# Patient Record
Sex: MALE | Race: WHITE | NOT HISPANIC OR LATINO | URBAN - METROPOLITAN AREA
[De-identification: names, ages, dates, MRNs, and addresses within clinical notes are randomized per-mention and may not be internally consistent; named-entity substitution may affect disease eponyms.]

---

## 2021-04-10 ENCOUNTER — EMERGENCY (EMERGENCY)
Facility: HOSPITAL | Age: 76
LOS: 1 days | Discharge: HOME | End: 2021-04-10
Attending: EMERGENCY MEDICINE
Payer: MEDICARE

## 2021-04-10 VITALS
OXYGEN SATURATION: 96 % | DIASTOLIC BLOOD PRESSURE: 74 MMHG | TEMPERATURE: 100 F | HEART RATE: 110 BPM | SYSTOLIC BLOOD PRESSURE: 126 MMHG | RESPIRATION RATE: 17 BRPM

## 2021-04-10 VITALS
TEMPERATURE: 101 F | DIASTOLIC BLOOD PRESSURE: 70 MMHG | WEIGHT: 179.9 LBS | HEART RATE: 140 BPM | OXYGEN SATURATION: 93 % | RESPIRATION RATE: 20 BRPM | HEIGHT: 68 IN | SYSTOLIC BLOOD PRESSURE: 150 MMHG

## 2021-04-10 LAB
RAPID RVP RESULT: SIGNIFICANT CHANGE UP
SARS-COV-2 RNA SPEC QL NAA+PROBE: SIGNIFICANT CHANGE UP

## 2021-04-10 PROCEDURE — 99283 EMERGENCY DEPT VISIT LOW MDM: CPT | Mod: CS,GC

## 2021-04-10 RX ORDER — ACETAMINOPHEN 500 MG
975 TABLET ORAL ONCE
Refills: 0 | Status: COMPLETED | OUTPATIENT
Start: 2021-04-10 | End: 2021-04-10

## 2021-04-10 RX ADMIN — Medication 975 MILLIGRAM(S): at 19:28

## 2021-04-10 NOTE — ED PROVIDER NOTE - OBJECTIVE STATEMENT
Attn - pt seen in G14 - pt with fever early this am and this afternoon with myalgia, headache, mild cough.  NO: sob/be, diarrhea, urinary symptoms, rash, cp, abdo pain.  wife getting chemo.  Last smoked in 1974.  Pt rec'd Pfizer vaccine x 2.

## 2021-04-10 NOTE — ED PROVIDER NOTE - PHYSICAL EXAMINATION
Attn - alert, febrile, tachycardia, NAD, no pallor or jaundice, PERRL 3 mm, moist mm, skin - warm and dry, Lungs - clear, no w/r/r, good BS bilaterally, Cor - rr, no M, no rub, Abdo - ND, soft, NT, no HSM, no CVAT, no guarding or rebound. Extremities - no edema, no calf tenderness, distal pulses intact and symmetrical, Neuro - intact and non-focal

## 2021-04-10 NOTE — ED PROVIDER NOTE - NSFOLLOWUPINSTRUCTIONS_ED_ALL_ED_FT
Self Quarantine for probable COVID.   Tylenol (acetaminophen) two tablets every 4-6 hours or Motrin (Ibuprofen) 2-3 tabs every 6-8 hours if needed for fever.  Maximum Tylenol of 4,000 mg per day.   Return to ER if develop shortness of breath.  Will be enrolled in [facility] program if your COVID test is positive.  Your results will come through your cell phone.

## 2021-04-10 NOTE — ED ADULT NURSE NOTE - OBJECTIVE STATEMENT
Male 76 years old with past medical history of Bladder cancer came in for sudden onset of fever associated with chills today. Temperature was 102.9. Pt took Tylenol 1 gram at 1300 this afternoon. Pt said he got Pfizer covid vaccine last February. Denies sick contacts. Denies chest pain, sob, cough, N/V or urinary symptoms. Safety maintained. Will continue to reassess.

## 2021-04-10 NOTE — ED ADULT TRIAGE NOTE - CHIEF COMPLAINT QUOTE
fever, chills, cough. took tylenol around 1 pm today.   s/p covid vaccine in Feb. denies sick contacts

## 2021-04-10 NOTE — ED PROVIDER NOTE - PATIENT PORTAL LINK FT
You can access the FollowMyHealth Patient Portal offered by [facility] by registering at the following website: [email or web address]. By joining [facility]’s FollowMyHealth portal, you will also be able to view your health information using other applications (apps) compatible with our system.

## 2021-04-12 ENCOUNTER — INPATIENT (INPATIENT)
Facility: HOSPITAL | Age: 76
LOS: 3 days | Discharge: HOME | DRG: 872 | End: 2021-04-16
Attending: INTERNAL MEDICINE | Admitting: INTERNAL MEDICINE
Payer: MEDICARE

## 2021-04-12 VITALS
TEMPERATURE: 98 F | HEIGHT: 68 IN | SYSTOLIC BLOOD PRESSURE: 82 MMHG | DIASTOLIC BLOOD PRESSURE: 53 MMHG | WEIGHT: 179.9 LBS | OXYGEN SATURATION: 96 % | HEART RATE: 120 BPM | RESPIRATION RATE: 19 BRPM

## 2021-04-12 DIAGNOSIS — R09.89 OTHER SPECIFIED SYMPTOMS AND SIGNS INVOLVING THE CIRCULATORY AND RESPIRATORY SYSTEMS: ICD-10-CM

## 2021-04-12 DIAGNOSIS — N39.0 URINARY TRACT INFECTION, SITE NOT SPECIFIED: ICD-10-CM

## 2021-04-12 PROBLEM — Z78.9 OTHER SPECIFIED HEALTH STATUS: Chronic | Status: ACTIVE | Noted: 2021-04-10

## 2021-04-12 LAB
ALBUMIN SERPL ELPH-MCNC: 3.7 G/DL — SIGNIFICANT CHANGE UP (ref 3.3–5)
ALP SERPL-CCNC: 79 U/L — SIGNIFICANT CHANGE UP (ref 40–120)
ALT FLD-CCNC: 30 U/L — SIGNIFICANT CHANGE UP (ref 10–45)
ANION GAP SERPL CALC-SCNC: 19 MMOL/L — HIGH (ref 5–17)
APPEARANCE UR: ABNORMAL
APTT BLD: 29.5 SEC — SIGNIFICANT CHANGE UP (ref 27.5–35.5)
AST SERPL-CCNC: 35 U/L — SIGNIFICANT CHANGE UP (ref 10–40)
BACTERIA # UR AUTO: NEGATIVE — SIGNIFICANT CHANGE UP
BASOPHILS # BLD AUTO: 0 K/UL — SIGNIFICANT CHANGE UP (ref 0–0.2)
BASOPHILS NFR BLD AUTO: 0 % — SIGNIFICANT CHANGE UP (ref 0–2)
BILIRUB SERPL-MCNC: 1.4 MG/DL — HIGH (ref 0.2–1.2)
BILIRUB UR-MCNC: NEGATIVE — SIGNIFICANT CHANGE UP
BUN SERPL-MCNC: 40 MG/DL — HIGH (ref 7–23)
CALCIUM SERPL-MCNC: 8.6 MG/DL — SIGNIFICANT CHANGE UP (ref 8.4–10.5)
CHLORIDE SERPL-SCNC: 96 MMOL/L — SIGNIFICANT CHANGE UP (ref 96–108)
CO2 SERPL-SCNC: 20 MMOL/L — LOW (ref 22–31)
COLOR SPEC: YELLOW — SIGNIFICANT CHANGE UP
CREAT SERPL-MCNC: 2 MG/DL — HIGH (ref 0.5–1.3)
DIFF PNL FLD: ABNORMAL
EOSINOPHIL # BLD AUTO: 0 K/UL — SIGNIFICANT CHANGE UP (ref 0–0.5)
EOSINOPHIL NFR BLD AUTO: 0 % — SIGNIFICANT CHANGE UP (ref 0–6)
EPI CELLS # UR: 1 /HPF — SIGNIFICANT CHANGE UP
GAS PNL BLDV: SIGNIFICANT CHANGE UP
GLUCOSE SERPL-MCNC: 139 MG/DL — HIGH (ref 70–99)
GLUCOSE UR QL: NEGATIVE — SIGNIFICANT CHANGE UP
HCT VFR BLD CALC: 33.2 % — LOW (ref 39–50)
HGB BLD-MCNC: 10.7 G/DL — LOW (ref 13–17)
HYALINE CASTS # UR AUTO: 3 /LPF — HIGH (ref 0–2)
INR BLD: 1.14 RATIO — SIGNIFICANT CHANGE UP (ref 0.88–1.16)
KETONES UR-MCNC: NEGATIVE — SIGNIFICANT CHANGE UP
LACTATE BLDV-MCNC: 1.6 MMOL/L — SIGNIFICANT CHANGE UP (ref 0.7–2)
LACTATE BLDV-MCNC: 3.8 MMOL/L — HIGH (ref 0.7–2)
LACTATE SERPL-SCNC: 3 MMOL/L — HIGH (ref 0.7–2)
LACTATE SERPL-SCNC: 4.1 MMOL/L — CRITICAL HIGH (ref 0.7–2)
LEUKOCYTE ESTERASE UR-ACNC: ABNORMAL
LYMPHOCYTES # BLD AUTO: 0.33 K/UL — LOW (ref 1–3.3)
LYMPHOCYTES # BLD AUTO: 1.8 % — LOW (ref 13–44)
MCHC RBC-ENTMCNC: 19.2 PG — LOW (ref 27–34)
MCHC RBC-ENTMCNC: 32.2 GM/DL — SIGNIFICANT CHANGE UP (ref 32–36)
MCV RBC AUTO: 59.6 FL — LOW (ref 80–100)
MONOCYTES # BLD AUTO: 0.81 K/UL — SIGNIFICANT CHANGE UP (ref 0–0.9)
MONOCYTES NFR BLD AUTO: 4.4 % — SIGNIFICANT CHANGE UP (ref 2–14)
NEUTROPHILS # BLD AUTO: 17.35 K/UL — HIGH (ref 1.8–7.4)
NEUTROPHILS NFR BLD AUTO: 85.8 % — HIGH (ref 43–77)
NITRITE UR-MCNC: NEGATIVE — SIGNIFICANT CHANGE UP
PH UR: 6 — SIGNIFICANT CHANGE UP (ref 5–8)
PLATELET # BLD AUTO: 123 K/UL — LOW (ref 150–400)
POTASSIUM SERPL-MCNC: 3 MMOL/L — LOW (ref 3.5–5.3)
POTASSIUM SERPL-SCNC: 3 MMOL/L — LOW (ref 3.5–5.3)
PROT SERPL-MCNC: 6.6 G/DL — SIGNIFICANT CHANGE UP (ref 6–8.3)
PROT UR-MCNC: 100 — SIGNIFICANT CHANGE UP
PROTHROM AB SERPL-ACNC: 13.6 SEC — SIGNIFICANT CHANGE UP (ref 10.6–13.6)
RAPID RVP RESULT: SIGNIFICANT CHANGE UP
RBC # BLD: 5.57 M/UL — SIGNIFICANT CHANGE UP (ref 4.2–5.8)
RBC # FLD: 16.5 % — HIGH (ref 10.3–14.5)
RBC CASTS # UR COMP ASSIST: 3 /HPF — SIGNIFICANT CHANGE UP (ref 0–4)
SARS-COV-2 RNA SPEC QL NAA+PROBE: SIGNIFICANT CHANGE UP
SODIUM SERPL-SCNC: 135 MMOL/L — SIGNIFICANT CHANGE UP (ref 135–145)
SP GR SPEC: 1.02 — SIGNIFICANT CHANGE UP (ref 1.01–1.02)
UROBILINOGEN FLD QL: NEGATIVE — SIGNIFICANT CHANGE UP
WBC # BLD: 18.5 K/UL — HIGH (ref 3.8–10.5)
WBC # FLD AUTO: 18.5 K/UL — HIGH (ref 3.8–10.5)
WBC UR QL: 199 /HPF — HIGH (ref 0–5)

## 2021-04-12 PROCEDURE — 99291 CRITICAL CARE FIRST HOUR: CPT | Mod: CS

## 2021-04-12 PROCEDURE — 93010 ELECTROCARDIOGRAM REPORT: CPT

## 2021-04-12 PROCEDURE — 71045 X-RAY EXAM CHEST 1 VIEW: CPT | Mod: 26

## 2021-04-12 RX ORDER — ACETAMINOPHEN 500 MG
650 TABLET ORAL EVERY 6 HOURS
Refills: 0 | Status: DISCONTINUED | OUTPATIENT
Start: 2021-04-12 | End: 2021-04-16

## 2021-04-12 RX ORDER — CEFTRIAXONE 500 MG/1
1000 INJECTION, POWDER, FOR SOLUTION INTRAMUSCULAR; INTRAVENOUS ONCE
Refills: 0 | Status: COMPLETED | OUTPATIENT
Start: 2021-04-12 | End: 2021-04-12

## 2021-04-12 RX ORDER — HEPARIN SODIUM 5000 [USP'U]/ML
5000 INJECTION INTRAVENOUS; SUBCUTANEOUS EVERY 12 HOURS
Refills: 0 | Status: DISCONTINUED | OUTPATIENT
Start: 2021-04-12 | End: 2021-04-16

## 2021-04-12 RX ORDER — SIMVASTATIN 20 MG/1
20 TABLET, FILM COATED ORAL AT BEDTIME
Refills: 0 | Status: DISCONTINUED | OUTPATIENT
Start: 2021-04-12 | End: 2021-04-16

## 2021-04-12 RX ORDER — PANTOPRAZOLE SODIUM 20 MG/1
40 TABLET, DELAYED RELEASE ORAL
Refills: 0 | Status: DISCONTINUED | OUTPATIENT
Start: 2021-04-12 | End: 2021-04-16

## 2021-04-12 RX ORDER — LOSARTAN POTASSIUM 100 MG/1
100 TABLET, FILM COATED ORAL DAILY
Refills: 0 | Status: DISCONTINUED | OUTPATIENT
Start: 2021-04-12 | End: 2021-04-16

## 2021-04-12 RX ORDER — SODIUM CHLORIDE 9 MG/ML
2500 INJECTION, SOLUTION INTRAVENOUS ONCE
Refills: 0 | Status: COMPLETED | OUTPATIENT
Start: 2021-04-12 | End: 2021-04-12

## 2021-04-12 RX ORDER — TRIAMTERENE/HYDROCHLOROTHIAZID 75 MG-50MG
1 TABLET ORAL DAILY
Refills: 0 | Status: DISCONTINUED | OUTPATIENT
Start: 2021-04-12 | End: 2021-04-16

## 2021-04-12 RX ADMIN — SODIUM CHLORIDE 2500 MILLILITER(S): 9 INJECTION, SOLUTION INTRAVENOUS at 10:50

## 2021-04-12 RX ADMIN — SODIUM CHLORIDE 2500 MILLILITER(S): 9 INJECTION, SOLUTION INTRAVENOUS at 12:45

## 2021-04-12 RX ADMIN — Medication 1 TABLET(S): at 17:08

## 2021-04-12 RX ADMIN — SIMVASTATIN 20 MILLIGRAM(S): 20 TABLET, FILM COATED ORAL at 21:05

## 2021-04-12 RX ADMIN — CEFTRIAXONE 100 MILLIGRAM(S): 500 INJECTION, POWDER, FOR SOLUTION INTRAMUSCULAR; INTRAVENOUS at 12:39

## 2021-04-12 RX ADMIN — Medication 650 MILLIGRAM(S): at 19:15

## 2021-04-12 NOTE — H&P ADULT - ASSESSMENT
Patient is a 76 year old male with PMHx of remote bladder CA, HTN, HLD presenting for second visit to the ER since Saturday for persistent fevers and shortness of breath. Patient reports that he was having fevers up to 103F when he came to the ER on Saturday, was Covid swabbed and RVP was done and both were negative. Patient states that he received both Covid vaccines > 1mo ago and has not been in contact with anyone with infectious type symptoms or who recently traveled. He states that he has had persistent fevers last one being 101.4 at 9AM today. Patient took Tylenol but still not feeling well generally so he decided to come back today. He denies any cough, chest pain, nausea, vomiting, diarrhea. Denies any other new symptoms.    # Sepsis:  + Fevers of unknown origin   Rapid HR  WBC 18.50  Lactate 3.0, repeat 4.1  D-Dimer 805  Follow up blood and urine cultures  CTA held due to eGFR  Appreciate ID consult    # UTI:  s/p Ceftriaxone x1 in ED   Follow up urine cultures    # MONALISA:  Baseline Cr unknown  Follow up urine lytes  Follow up renal ultrasound  Appreciate renal consult    # HTN:  Chronic, stable  Home meds: Losartan 100MG daily, Triamt HCTZ 37.5MG-25MG daily    # HLD:  Chronic, stable  Home med: Simvastatin 20MG QHS    # DVT ppx:  Heparin subq Q12H Patient is a 76 year old male with PMHx of remote bladder CA, HTN, HLD presenting for second visit to the ER since Saturday for persistent fevers and shortness of breath. Patient reports that he was having fevers up to 103F when he came to the ER on Saturday, was Covid swabbed and RVP was done and both were negative. Patient states that he received both Covid vaccines > 1mo ago and has not been in contact with anyone with infectious type symptoms or who recently traveled. He states that he has had persistent fevers last one being 101.4 at 9AM today. Patient took Tylenol but still not feeling well generally so he decided to come back today. He denies any cough, chest pain, nausea, vomiting, diarrhea. Denies any other new symptoms.    # Sepsis:  + Fevers of unknown origin   Covid swabs negative (from [facility] ED sat 4/10)  Rapid HR  WBC 18.50  Lactate 3.0, repeat 4.1  D-Dimer 805  Follow up blood and urine cultures  CTA held due to eGFR  Appreciate ID consult    # UTI:  s/p Ceftriaxone x1 in ED   Follow up urine cultures    # MONALISA:  Baseline Cr unknown  Follow up urine lytes  Follow up renal ultrasound  Appreciate renal consult    # HTN:  Chronic, stable  Home meds: Losartan 100MG daily, Triamt HCTZ 37.5MG-25MG daily    # HLD:  Chronic, stable  Home med: Simvastatin 20MG QHS    # DVT ppx:  Heparin subq Q12H

## 2021-04-12 NOTE — ED ADULT TRIAGE NOTE - CHIEF COMPLAINT QUOTE
fever shortness of breath and dyspnea, dizziness when walking since last Wednesday  here on Saturday, negative for Covid

## 2021-04-12 NOTE — ED ADULT NURSE NOTE - OBJECTIVE STATEMENT
5y/o male aaox4 hx blader CA presents to the ED ambulatory  from home c/o fever, dizziness, chills, sob . Pt states that sudden began on Wednesday fever, chills, dizziness while walking, sob upon exertion, pt come to ED last Saturday for the same symptoms, DC home after tested covid (results negative)  but the fever is persistent  (103), SOB , controlling the fever w/ Tylenol, last dosage 2 hours ago, also c/o weakness    . Denies, n/v, abd pain, diarrhea/constipation, numbness/tingling, urinary symptoms, in no respiratory distress, no CP, Safety and comfort measures initiated- bed placed in lowest position and side rails raised. Pt oriented to call bell system.

## 2021-04-12 NOTE — ED PROVIDER NOTE - CLINICAL SUMMARY MEDICAL DECISION MAKING FREE TEXT BOX
Bharath Irving MD, FACEP: In this physician's medical judgement based on clinical history and physical exam, patient with fevers of unknown origin, rapid hr and sensation of shortness of breath, patient with neobladder and no abdominal pain, no pleuritic chest pain, no nausea/vomiting, no diarrhea. Patient with viral infection vs occult uti secondary to neobladder and remains asymptomatic for uti other than tachycardia and fever.   Will obtain IV x 2, cbc, cmp, +/-ce, VBG with lactate at time 0, fluid bolus @ 30cc/kg initiated upon departure from the room on initial assessment within 30 minutes of arrival, urine analysis and blood cultures x 2 are obtained prior to antibiotics =>prior to administration of antibiotics goal within 3 hours of arrival, and will repeat lactate if the original is elevated and do so within 6 hours of initial lactate.   wbc noted 18k  antibiotics given  repeat lactate requested  CTA held due to eGFR and awaiting UA for source of infection, likely sob and is secondary to tachycardia in the setting of infection and dimer is elevated due to this as well, dimer was ordered secondary to concern for COVID-19, if infectious work up is unrevealing will consider heparin and inpatient evaluation for dvt/pe  Will follow up on labs, analgesia, imaging, reassess and disposition to the inpatient team as clinically indicated.

## 2021-04-12 NOTE — H&P ADULT - ATTENDING COMMENTS
Pt seen and examined 4/12/21.  Agree with PA H+P above  Vital/imaging/labs were reviewed  Pt comfortable in bed.  No significant pulmonary/cardiac/abdominal findings, although he mentioned to me some intermittent sharp rt flank pain.  But he denies any recent changes in his urination habits.  He is admitted for complicated UTI and MONALISA (although will check previous values in PMD's chart).  Will check urine lytes, renal U/S, and monitor for any urinary retention.  Cont ceftiaxone in meantime while we monitor the urine and blood cultures.

## 2021-04-12 NOTE — H&P ADULT - HISTORY OF PRESENT ILLNESS
Patient is a 76 year old male with PMHx of remote bladder CA presenting for second visit to the ER since Saturday for persistent fevers and shortness of breath. Patient reports that he was having fevers up to 103F when he came to the ER on Saturday, was Covid swabbed and RVP was done and both were negative. Patient states that he received both Covid vaccines > 1mo ago and has not been in contact with anyone with infectious type symptoms or who recently traveled. He states that he has had persistent fevers last one being 101.4 at 9AM today. Patient took Tylenol but still not feeling well generally so he decided to come back today. He denies any cough, chest pain, nausea, vomiting, diarrhea. Denies any other new symptoms. Patient is a 76 year old male with PMHx of remote bladder CA, HTN, HLD presenting for second visit to the ER since Saturday for persistent fevers and shortness of breath. Patient reports that he was having fevers up to 103F when he came to the ER on Saturday, was Covid swabbed and RVP was done and both were negative. Patient states that he received both Covid vaccines > 1mo ago and has not been in contact with anyone with infectious type symptoms or who recently traveled. He states that he has had persistent fevers last one being 101.4 at 9AM today. Patient took Tylenol but still not feeling well generally so he decided to come back today. He denies any cough, chest pain, nausea, vomiting, diarrhea. Denies any other new symptoms.

## 2021-04-12 NOTE — ED PROVIDER NOTE - OBJECTIVE STATEMENT
77 y/o M pmhx remote [facility] CA presenting for second visit to the ER since Saturday for persistent fevers and shortness of breath. Patient reports that he was having fevers up to 103F when he came to the ER on Saturday, was covid swabbed and RVP was done and both were negative. STates that he received both covid vaccines > 1mo ago and has not been in contact with anyone with infectious type symptoms or who recently traveled. He states that he has persisted to have fevers and not feeling well generally so he decided to come back today. He denies any cough, chest pain, nausea, vomiting, diarrhea. Denies any other new symptoms.

## 2021-04-12 NOTE — ED PROVIDER NOTE - CRITICAL CARE ATTENDING CONTRIBUTION TO CARE
Patient seen with PA.  See MDM above.   Patient endorsed to the medical team at the time of admission. Based on patient's history and physical exam, as well as the results of today's workup, I feel that patient warrants admission to the hospital for further workup/evaluation and continued management. I discussed the findings of today's workup with the patient and addressed the patient's questions and concerns. The patient was agreeable with admission. Our team spoke with the inpatient receiving team who accepted the patient for admission and subsequently took over the patient's care at the time of admission. The receiving team will follow up on pending labs, analgesia, any clinical imaging results, ancillary findings, reassess, and disposition as clinically indicated. Details of patient and plan conveyed to receiving physician team and conveyed back for understanding. There were no questions at this time about the patient's status, disposition, and plan. Patient's care to be taken over by receiving physician team at this time, all decisions regarding the progression of care will be made at their discretion.

## 2021-04-12 NOTE — H&P ADULT - NSHPREVIEWOFSYSTEMS_GEN_ALL_CORE
GENERAL: no weakness, + fever, no chills, no weight loss/gain  EYES/ENT: No visual changes, no vertigo or throat pain  NECK: No pain or stiffness   RESPIRATORY: no cough, no wheezing, no hemoptysis, no dyspnea, + shortness of breath  CARDIOVASCULAR: no chest pain or palpitations  GASTROINTESTINAL: no n/v/d, no abdominal or epigastric pain  GENITOURINARY: no dysuria, no frequency, no nocturia, no hematuria  MUSCULOSKELETAL: no trauma, no sprain/strain, no myalgias, no arthralgias, no fracture  NEUROLOGICAL: no HA, no dizziness, no weakness, no numbness  SKIN: No itching, rashes

## 2021-04-12 NOTE — ED PROVIDER NOTE - CARE PLAN
Principal Discharge DX:	Urinary tract infection   Principal Discharge DX:	Urinary tract infection  Secondary Diagnosis:	MONALISA (acute kidney injury)  Secondary Diagnosis:	Sepsis

## 2021-04-12 NOTE — ED PROVIDER NOTE - NS ED ROS FT
Constitutional: +fever and chills  Eyes: No visual changes, eye pain or redness  HEENT: No throat pain, ear pain, nasal pain. No nose bleeding.  CV: No chest pain or lower extremity edema  Resp: +mild shortness of breath. no cough  GI: No abd pain. No nausea or vomiting. No diarrhea. No constipation.   : No dysuria, hematuria.   MSK: No musculoskeletal pain  Skin: No rash  Neuro: No headache. No numbness or tingling. No weakness. Constitutional: +fever and chills  Eyes: No visual changes, eye pain or redness  HEENT: No throat pain, ear pain, nasal pain. No nose bleeding.  CV: No chest pain or lower extremity edema  Resp: +mild shortness of breath. no cough  GI: No abd pain. No nausea or vomiting. No diarrhea. No constipation.   : No dysuria, hematuria.   MSK: No musculoskeletal pain  Skin: No rash  Neuro: No headache. No numbness or tingling. No weakness.  ROS as per HPI, attending statement, or otherwise negative.

## 2021-04-12 NOTE — ED PROVIDER NOTE - PHYSICAL EXAMINATION
GEN: Well Appearing, Nontoxic, NAD  HEENT: NC/AT, Symm Facies. PERRL, EOMI, MMM, posterior pharynx clear  CV: No JVD/Bruits or stridor;  +S1S2. tachycardic, regular. w/o m/g/r  RESP: CTAB w/o w/r/r  ABD: Soft, nt/nd, +BS. No guarding/rebound. No RUQ tender, no CVAT  EXT/MSK: No lower extremity edema or calf tenderness. WWP, palpable pulses. FROMx4  SKIN: No erythema, lesions or rash  Neuro: Grossly intact, AOX3 with normal speech, CN II-XII intact; Sensation intact, motor 5/5 throughout. Gait normal GEN: Well Appearing, Nontoxic, NAD, mild rigors in room  HEENT: NC/AT, Symm Facies. PERRL, EOMI, MMM, posterior pharynx clear  CV: No JVD/Bruits or stridor;  +S1S2. tachycardic, regular. w/o m/g/r  RESP: CTAB w/o w/r/r  ABD: Soft, nt/nd, +BS. No guarding/rebound. No RUQ tender, no CVAT  EXT/MSK: No lower extremity edema or calf tenderness. WWP, palpable pulses. FROMx4  SKIN: No erythema, lesions or rash  Neuro: Grossly intact, AOX3 with normal speech, CN II-XII intact; Sensation intact, motor 5/5 throughout. Gait normal

## 2021-04-12 NOTE — ED PROVIDER NOTE - PROGRESS NOTE DETAILS
Sepsis re-evaluation- Vital signs appreciated, Capillary Refill: <2 sec; Pulses: full/equal bilaterally; Skin: Normal; Lungs: CTA b/l, skin exam well perfused. I have re-evaluated the patient's fluid status and reviewed the vital signs. Clinical evaluation demonstrates an appropriate response to fluid resuscitation, patient responding well to resuscitation. Given +urine with MONALISA, sepsis picture present at this time, relayed to admitting hospitalist that DD elevated in this setting we are going to refrain from CTA at this time and can be addressed during admission while treated for urosepsis. -Birdie Ochoa PA-C

## 2021-04-12 NOTE — H&P ADULT - NSHPLABSRESULTS_GEN_ALL_CORE
LABS:                        10.7   18.50 )-----------( 123      ( 2021 11:04 )             33.2     04-12    135  |  96  |  40<H>  ----------------------------<  139<H>  3.0<L>   |  20<L>  |  2.00<H>    Ca    8.6      2021 11:04    TPro  6.6  /  Alb  3.7  /  TBili  1.4<H>  /  DBili  x   /  AST  35  /  ALT  30  /  AlkPhos  79  04-12    PT/INR - ( 2021 11:05 )   PT: 13.6 sec;   INR: 1.14 ratio         PTT - ( 2021 11:05 )  PTT:29.5 sec  CAPILLARY BLOOD GLUCOSE            Urinalysis Basic - ( 2021 12:02 )    Color: Yellow / Appearance: Slightly Turbid / S.017 / pH: x  Gluc: x / Ketone: Negative  / Bili: Negative / Urobili: Negative   Blood: x / Protein: 100 / Nitrite: Negative   Leuk Esterase: Large / RBC: 3 /hpf /  /HPF   Sq Epi: x / Non Sq Epi: 1 /hpf / Bacteria: Negative        RADIOLOGY & ADDITIONAL TESTS:    < from: Xray Chest 1 View- PORTABLE-Urgent (21 @ 11:40) >    INTERPRETATION:    The heart is not enlarged. The mediastinum is not accurately evaluated on this image.  Elevated right hemidiaphragm.  Skin folds project over the right chest. There is linear atelectasis versus scar at the bases.The lungs are otherwise clear.  No pleural effusion or pneumothorax seen.  No acute bony abnormality noted.      IMPRESSION:  Limited right hemidiaphragm.    Linear atelectasis versus scar at the bases. The lungs are otherwise clear.    < end of copied text >        Imaging Personally Reviewed:  [x] YES  [ ] NO    Consultant(s) Notes Reviewed:  [x] YES  [ ] NO    Care Discussed with Consultants/Other Providers [x] YES  [ ] NO

## 2021-04-12 NOTE — ED PROVIDER NOTE - ATTENDING CONTRIBUTION TO CARE
See MDM above.   Patient endorsed to the medical team at the time of admission. Based on patient's history and physical exam, as well as the results of today's workup, I feel that patient warrants admission to the hospital for further workup/evaluation and continued management. I discussed the findings of today's workup with the patient and addressed the patient's questions and concerns. The patient was agreeable with admission. Our team spoke with the inpatient receiving team who accepted the patient for admission and subsequently took over the patient's care at the time of admission. The receiving team will follow up on pending labs, analgesia, any clinical imaging results, ancillary findings, reassess, and disposition as clinically indicated. Details of patient and plan conveyed to receiving physician team and conveyed back for understanding. There were no questions at this time about the patient's status, disposition, and plan. Patient's care to be taken over by receiving physician team at this time, all decisions regarding the progression of care will be made at their discretion.

## 2021-04-12 NOTE — H&P ADULT - NSHPPHYSICALEXAM_GEN_ALL_CORE
Vital Signs Last 24 Hrs  T(C): 36.7 (12 Apr 2021 13:15), Max: 36.8 (12 Apr 2021 10:54)  T(F): 98 (12 Apr 2021 13:15), Max: 98.3 (12 Apr 2021 10:54)  HR: 118 (12 Apr 2021 13:15) (96 - 135)  BP: 102/63 (12 Apr 2021 13:15) (82/53 - 109/85)  BP(mean): 76 (12 Apr 2021 13:15) (72 - 91)  RR: 18 (12 Apr 2021 13:15) (18 - 20)  SpO2: 100% (12 Apr 2021 13:15) (96% - 100%)    PHYSICAL EXAM:  GENERAL: NAD, well-developed, comfortable  HEAD:  Atraumatic, Normocephalic  EYES: EOMI, PERRLA, conjunctiva and sclera clear  NECK: Supple, No JVD  CHEST/LUNG: Clear to auscultation bilaterally; No wheeze  HEART: Regular rate and rhythm; No murmurs, rubs, or gallops  ABDOMEN: Soft, Nontender, Nondistended; Bowel sounds present  NEURO: AAOx3, no focal weakness, 5/5 b/l extremity strength, b/l knee no arthritis, no effusion   EXTREMITIES:  2+ Peripheral Pulses, No clubbing, cyanosis, or edema  SKIN: No rashes or lesions

## 2021-04-13 LAB
ANION GAP SERPL CALC-SCNC: 15 MMOL/L — SIGNIFICANT CHANGE UP (ref 5–17)
APPEARANCE UR: ABNORMAL
BILIRUB UR-MCNC: NEGATIVE — SIGNIFICANT CHANGE UP
BUN SERPL-MCNC: 42 MG/DL — HIGH (ref 7–23)
CALCIUM SERPL-MCNC: 7.9 MG/DL — LOW (ref 8.4–10.5)
CHLORIDE SERPL-SCNC: 96 MMOL/L — SIGNIFICANT CHANGE UP (ref 96–108)
CHLORIDE UR-SCNC: <35 MMOL/L — SIGNIFICANT CHANGE UP
CO2 SERPL-SCNC: 21 MMOL/L — LOW (ref 22–31)
COLOR SPEC: YELLOW — SIGNIFICANT CHANGE UP
COVID-19 SPIKE DOMAIN AB INTERP: POSITIVE
COVID-19 SPIKE DOMAIN ANTIBODY RESULT: >250 U/ML — HIGH
CREAT SERPL-MCNC: 1.7 MG/DL — HIGH (ref 0.5–1.3)
CULTURE RESULTS: NO GROWTH — SIGNIFICANT CHANGE UP
DIFF PNL FLD: ABNORMAL
GLUCOSE SERPL-MCNC: 110 MG/DL — HIGH (ref 70–99)
GLUCOSE UR QL: NEGATIVE — SIGNIFICANT CHANGE UP
HCT VFR BLD CALC: 28.7 % — LOW (ref 39–50)
HGB BLD-MCNC: 9.5 G/DL — LOW (ref 13–17)
KETONES UR-MCNC: NEGATIVE — SIGNIFICANT CHANGE UP
LEUKOCYTE ESTERASE UR-ACNC: ABNORMAL
MCHC RBC-ENTMCNC: 19.2 PG — LOW (ref 27–34)
MCHC RBC-ENTMCNC: 33.1 GM/DL — SIGNIFICANT CHANGE UP (ref 32–36)
MCV RBC AUTO: 57.9 FL — LOW (ref 80–100)
NITRITE UR-MCNC: NEGATIVE — SIGNIFICANT CHANGE UP
NRBC # BLD: 0 /100 WBCS — SIGNIFICANT CHANGE UP (ref 0–0)
OSMOLALITY UR: 313 MOS/KG — SIGNIFICANT CHANGE UP (ref 300–900)
PH UR: 6.5 — SIGNIFICANT CHANGE UP (ref 5–8)
PLATELET # BLD AUTO: 125 K/UL — LOW (ref 150–400)
POTASSIUM SERPL-MCNC: 2.8 MMOL/L — CRITICAL LOW (ref 3.5–5.3)
POTASSIUM SERPL-SCNC: 2.8 MMOL/L — CRITICAL LOW (ref 3.5–5.3)
POTASSIUM UR-SCNC: 25 MMOL/L — SIGNIFICANT CHANGE UP
PROT UR-MCNC: 100 — SIGNIFICANT CHANGE UP
RBC # BLD: 4.96 M/UL — SIGNIFICANT CHANGE UP (ref 4.2–5.8)
RBC # FLD: 16.2 % — HIGH (ref 10.3–14.5)
SARS-COV-2 IGG+IGM SERPL QL IA: >250 U/ML — HIGH
SARS-COV-2 IGG+IGM SERPL QL IA: POSITIVE
SODIUM SERPL-SCNC: 132 MMOL/L — LOW (ref 135–145)
SODIUM UR-SCNC: 33 MMOL/L — SIGNIFICANT CHANGE UP
SP GR SPEC: 1.01 — SIGNIFICANT CHANGE UP (ref 1.01–1.02)
SPECIMEN SOURCE: SIGNIFICANT CHANGE UP
UROBILINOGEN FLD QL: NEGATIVE — SIGNIFICANT CHANGE UP
WBC # BLD: 13.42 K/UL — HIGH (ref 3.8–10.5)
WBC # FLD AUTO: 13.42 K/UL — HIGH (ref 3.8–10.5)

## 2021-04-13 PROCEDURE — 76770 US EXAM ABDO BACK WALL COMP: CPT | Mod: 26

## 2021-04-13 RX ORDER — SODIUM CHLORIDE 0.65 %
1 AEROSOL, SPRAY (ML) NASAL
Refills: 0 | Status: DISCONTINUED | OUTPATIENT
Start: 2021-04-13 | End: 2021-04-16

## 2021-04-13 RX ORDER — CEFTRIAXONE 500 MG/1
1000 INJECTION, POWDER, FOR SOLUTION INTRAMUSCULAR; INTRAVENOUS EVERY 24 HOURS
Refills: 0 | Status: DISCONTINUED | OUTPATIENT
Start: 2021-04-13 | End: 2021-04-16

## 2021-04-13 RX ORDER — POTASSIUM CHLORIDE 20 MEQ
10 PACKET (EA) ORAL ONCE
Refills: 0 | Status: COMPLETED | OUTPATIENT
Start: 2021-04-13 | End: 2021-04-13

## 2021-04-13 RX ORDER — POTASSIUM CHLORIDE 20 MEQ
40 PACKET (EA) ORAL EVERY 4 HOURS
Refills: 0 | Status: COMPLETED | OUTPATIENT
Start: 2021-04-13 | End: 2021-04-13

## 2021-04-13 RX ADMIN — Medication 1 TABLET(S): at 04:41

## 2021-04-13 RX ADMIN — HEPARIN SODIUM 5000 UNIT(S): 5000 INJECTION INTRAVENOUS; SUBCUTANEOUS at 04:41

## 2021-04-13 RX ADMIN — Medication 650 MILLIGRAM(S): at 04:40

## 2021-04-13 RX ADMIN — Medication 40 MILLIEQUIVALENT(S): at 17:31

## 2021-04-13 RX ADMIN — Medication 650 MILLIGRAM(S): at 17:25

## 2021-04-13 RX ADMIN — Medication 650 MILLIGRAM(S): at 05:30

## 2021-04-13 RX ADMIN — Medication 100 MILLIEQUIVALENT(S): at 12:36

## 2021-04-13 RX ADMIN — Medication 40 MILLIEQUIVALENT(S): at 12:35

## 2021-04-13 RX ADMIN — PANTOPRAZOLE SODIUM 40 MILLIGRAM(S): 20 TABLET, DELAYED RELEASE ORAL at 04:40

## 2021-04-13 RX ADMIN — Medication 650 MILLIGRAM(S): at 10:36

## 2021-04-13 RX ADMIN — HEPARIN SODIUM 5000 UNIT(S): 5000 INJECTION INTRAVENOUS; SUBCUTANEOUS at 17:26

## 2021-04-13 RX ADMIN — Medication 40 MILLIEQUIVALENT(S): at 14:36

## 2021-04-13 RX ADMIN — Medication 650 MILLIGRAM(S): at 11:00

## 2021-04-13 RX ADMIN — CEFTRIAXONE 100 MILLIGRAM(S): 500 INJECTION, POWDER, FOR SOLUTION INTRAMUSCULAR; INTRAVENOUS at 12:36

## 2021-04-13 RX ADMIN — SIMVASTATIN 20 MILLIGRAM(S): 20 TABLET, FILM COATED ORAL at 21:10

## 2021-04-13 RX ADMIN — Medication 650 MILLIGRAM(S): at 23:44

## 2021-04-13 NOTE — PROGRESS NOTE ADULT - ASSESSMENT
Patient is a 76 year old male with PMHx of remote bladder CA, HTN, HLD presenting for second visit to the ER since Saturday for persistent fevers and shortness of breath. Patient reports that he was having fevers up to 103F when he came to the ER on Saturday, was Covid swabbed and RVP was done and both were negative. Patient states that he received both Covid vaccines > 1mo ago and has not been in contact with anyone with infectious type symptoms or who recently traveled. He states that he has had persistent fevers last one being 101.4 at 9AM today. Patient took Tylenol but still not feeling well generally so he decided to come back today. He denies any cough, chest pain, nausea, vomiting, diarrhea. Denies any other new symptoms.    # Sepsis:  2' UTI  Covid swabs negative (from [facility] ED sat 4/10)  Rapid HR  WBC 18.50  Lactate 3.0, repeat 4.1  D-Dimer 805  Follow up blood and urine cultures  CTA held due to eGFR  Appreciate ID consult    # UTI:  Cont ceftriaxone  Blood and urine cultures (-) so far    # MONALISA:  Likely 2' dehydration  Renal U/S showing only mild rt hydronephrosis  Cont PO fluids  Appreciate renal consult    # HTN:  Chronic, stable  Home meds: Losartan 100MG daily, Triamt HCTZ 37.5MG-25MG daily    # HLD:  Chronic, stable  Home med: Simvastatin 20MG QHS    # DVT ppx:  Heparin subq Q12H

## 2021-04-13 NOTE — GOALS OF CARE CONVERSATION - ADVANCED CARE PLANNING - CONVERSATION DETAILS
Discussed goals of care with the patient. Given to him our conversation  guide packet and MOLST form. Will follow up.

## 2021-04-13 NOTE — CONSULT NOTE ADULT - SUBJECTIVE AND OBJECTIVE BOX
[facility], Division of Infectious Diseases  SAM Tamayo, ASHUTOSH Warner  [phone]    HAL SINGLETON  76y, Male  [id number]    HPI--  HPI:  Patient is a 76 year old male with PMHx of remote bladder CA, HTN, HLD presenting for second visit to the ER since Saturday for persistent fevers and shortness of breath. Patient reports that he was having fevers up to 103F when he came to the ER on Saturday, was Covid swabbed and RVP was done and both were negative. Patient states that he received both Covid vaccines > 1mo ago and has not been in contact with anyone with infectious type symptoms or who recently traveled. He states that he has had persistent fevers last one being 101.4 at 9AM today. Patient took Tylenol but still not feeling well generally so he decided to come back today. He denies any cough, chest pain, nausea, vomiting, diarrhea. Denies any other new symptoms. (2021 14:17)        Active Medications--  acetaminophen   Tablet .. 650 milliGRAM(s) Oral every 6 hours PRN  cefTRIAXone   IVPB 1000 milliGRAM(s) IV Intermittent every 24 hours  heparin   Injectable 5000 Unit(s) SubCutaneous every 12 hours  losartan 100 milliGRAM(s) Oral daily  pantoprazole    Tablet 40 milliGRAM(s) Oral before breakfast  simvastatin 20 milliGRAM(s) Oral at bedtime  sodium chloride 0.65% Nasal 1 Spray(s) Both Nostrils four times a day PRN  triamterene 37.5 mG/hydrochlorothiazide 25 mG Tablet 1 Tablet(s) Oral daily    Antimicrobials:   cefTRIAXone   IVPB 1000 milliGRAM(s) IV Intermittent every 24 hours    Immunologic:     ROS:  CONSTITUTIONAL: No fevers or chills. No weakness or headache. No weight changes.  EYES/ENT: No visual or hearing changes. No sore throat or throat pain .  NECK: No pain or stiffness  RESPIRATORY: No cough, wheezing, or hemoptysis. No shortness of breath  CARDIOVASCULAR: No chest pain or palpitations  GASTROINTESTINAL: No abdominal pain. No nausea or vomiting. No diarrhea or constipation.  GENITOURINARY: No dysuria, frequency or hematuria  NEUROLOGICAL: No numbness or weakness  SKIN: No itching or rashes  PSYCHIATRIC: Pleasant. Appropriate affect    Allergies: No Known Allergies    PMH -- No pertinent past medical history      PSH --   FH --   Social History --  EtOH: denies   Tobacco: denies   Drug Use: denies     Travel/Environmental/Occupational History:    Physical Exam--  Vital Signs Last 24 Hrs  T(F): 98.7 (2021 09:21), Max: 100.8 (2021 17:56)  HR: 99 (2021 09:21) (96 - 135)  BP: 112/71 (2021 09:21) (85/64 - 125/71)  RR: 18 (2021 09:21) (18 - 24)  SpO2: 96% (2021 09:21) (95% - 100%)  General: nontoxic-appearing, no acute distress  HEENT: NC/AT, EOMI, anicteric, conjunctiva pink and moist, oropharynx clear, dentition fair  Neck: Not rigid. No sense of mass. No LAD  Lungs: Clear bilaterally without rales, wheezing or rhonchi  Heart: Regular rate and rhythm. No murmur, rub or gallop.  Abdomen: Soft. Nondistended. Nontender. Bowel sounds present. No organomegaly.  Back: No spinal tenderness. No costovertebral angle tenderness.  Extremities: No cyanosis or clubbing. No edema.   Skin: Warm. Dry. Good turgor. No rash. No vasculitic stigmata.    Laboratory & Imaging Data--  CBC:                       9.5    13.42 )-----------( 125      ( 2021 07:16 )             28.7     CMP: 04-13    132<L>  |  96  |  42<H>  ----------------------------<  110<H>  2.8<LL>   |  21<L>  |  1.70<H>    Ca    7.9<L>      2021 07:16    TPro  6.6  /  Alb  3.7  /  TBili  1.4<H>  /  DBili  x   /  AST  35  /  ALT  30  /  AlkPhos  79  04-12    LIVER FUNCTIONS - ( 2021 11:04 )  Alb: 3.7 g/dL / Pro: 6.6 g/dL / ALK PHOS: 79 U/L / ALT: 30 U/L / AST: 35 U/L / GGT: x           Urinalysis Basic - ( 2021 02:15 )    Color: Yellow / Appearance: Slightly Turbid / S.012 / pH: x  Gluc: x / Ketone: Negative  / Bili: Negative / Urobili: Negative   Blood: x / Protein: 100 / Nitrite: Negative   Leuk Esterase: Large / RBC: 1 /hpf / WBC 19 /HPF   Sq Epi: x / Non Sq Epi: 11 /hpf / Bacteria: Negative        Microbiology: reviewed      Radiology: reviewed  < from: Xray Chest 1 View- PORTABLE-Urgent (21 @ 11:40) >    EXAM:  XR CHEST PORTABLE URGENT 1V                            PROCEDURE DATE:  2021            INTERPRETATION:  TIME OF EXAM: 2021 at 11:27 AM.    CLINICAL INFORMATION: Sepsis.    COMPARISON:  None available    TECHNIQUE:   AP Portable chest x-ray.    INTERPRETATION:    The heart is not enlarged. The mediastinum is not accurately evaluated on this image.  Elevated right hemidiaphragm.  Skin folds project over the right chest. There is linear atelectasis versus scar at the bases.The lungs are otherwise clear.  No pleural effusion or pneumothorax seen.  No acute bony abnormality noted.      IMPRESSION:  Limited right hemidiaphragm.    Linear atelectasis versus scar at the bases. The lungs are otherwise clear.                JASON AHUJA MD; Attending Radiologist  This document has been electronically signed. 2021 11:51AM    < end of copied text >     [facility], Division of Infectious Diseases  SAM Tamayo, ASHUTOSH Warner  [phone]    HAL SINGLETON  76y, Male  [id number]    HPI--  HPI:  Patient is a 76 year old male with PMHx of remote bladder CA, HTN, HLD presenting for second visit to the ER since Saturday for persistent fevers and shortness of breath. Patient reports that he was having fevers up to 103F when he came to the ER on Saturday, was Covid swabbed and RVP was done and both were negative. Patient states that he received both Covid vaccines > 1mo ago and has not been in contact with anyone with infectious type symptoms or who recently traveled. He states that he has had persistent fevers last one being 101.4 at 9AM today. Patient took Tylenol but still not feeling well generally so he decided to come back today. He denies any cough, chest pain, nausea, vomiting, diarrhea. Denies any other new symptoms. (2021 14:17)  Started on ceftriaxone  Pt seen and examined at bedside. Low grade temps to 99  No complaints. Denies abdominal pain, back pain, n/v/d.   Not currently having any urinary sx.       Active Medications--  acetaminophen   Tablet .. 650 milliGRAM(s) Oral every 6 hours PRN  cefTRIAXone   IVPB 1000 milliGRAM(s) IV Intermittent every 24 hours  heparin   Injectable 5000 Unit(s) SubCutaneous every 12 hours  losartan 100 milliGRAM(s) Oral daily  pantoprazole    Tablet 40 milliGRAM(s) Oral before breakfast  simvastatin 20 milliGRAM(s) Oral at bedtime  sodium chloride 0.65% Nasal 1 Spray(s) Both Nostrils four times a day PRN  triamterene 37.5 mG/hydrochlorothiazide 25 mG Tablet 1 Tablet(s) Oral daily    Antimicrobials:   cefTRIAXone   IVPB 1000 milliGRAM(s) IV Intermittent every 24 hours    Immunologic:     ROS:  CONSTITUTIONAL: No fevers or chills. No weakness or headache. No weight changes.  EYES/ENT: No visual or hearing changes. No sore throat or throat pain .  NECK: No pain or stiffness  RESPIRATORY: No cough, wheezing, or hemoptysis. No shortness of breath  CARDIOVASCULAR: No chest pain or palpitations  GASTROINTESTINAL: No abdominal pain. No nausea or vomiting. No diarrhea or constipation.  GENITOURINARY: No dysuria, frequency or hematuria  NEUROLOGICAL: No numbness or weakness  SKIN: No itching or rashes  PSYCHIATRIC: Pleasant. Appropriate affect    Allergies: No Known Allergies    PMH -- No pertinent past medical history      PSH --   FH --   Social History --  EtOH: denies   Tobacco: denies   Drug Use: denies     Travel/Environmental/Occupational History:    Physical Exam--  Vital Signs Last 24 Hrs  T(F): 98.7 (2021 09:21), Max: 100.8 (2021 17:56)  HR: 99 (2021 09:21) (96 - 135)  BP: 112/71 (2021 09:21) (85/64 - 125/71)  RR: 18 (2021 09:21) (18 - 24)  SpO2: 96% (2021 09:21) (95% - 100%)  General: nontoxic-appearing, no acute distress  HEENT: NC/AT, EOMI, anicteric, conjunctiva pink and moist, oropharynx clear, dentition fair  Neck: Not rigid. No sense of mass. No LAD  Lungs: Clear bilaterally without rales, wheezing or rhonchi  Heart: Regular rate and rhythm. No murmur, rub or gallop.  Abdomen: Soft. Nondistended. Nontender. Bowel sounds present. No organomegaly.  Back: No spinal tenderness. No costovertebral angle tenderness.  Extremities: No cyanosis or clubbing. No edema.   Skin: Warm. Dry. Good turgor. No rash. No vasculitic stigmata.    Laboratory & Imaging Data--  CBC:                       9.5    13.42 )-----------( 125      ( 2021 07:16 )             28.7     CMP: 13    132<L>  |  96  |  42<H>  ----------------------------<  110<H>  2.8<LL>   |  21<L>  |  1.70<H>    Ca    7.9<L>      2021 07:16    TPro  6.6  /  Alb  3.7  /  TBili  1.4<H>  /  DBili  x   /  AST  35  /  ALT  30  /  AlkPhos  79  04-12    LIVER FUNCTIONS - ( 2021 11:04 )  Alb: 3.7 g/dL / Pro: 6.6 g/dL / ALK PHOS: 79 U/L / ALT: 30 U/L / AST: 35 U/L / GGT: x           Urinalysis Basic - ( 2021 02:15 )    Color: Yellow / Appearance: Slightly Turbid / S.012 / pH: x  Gluc: x / Ketone: Negative  / Bili: Negative / Urobili: Negative   Blood: x / Protein: 100 / Nitrite: Negative   Leuk Esterase: Large / RBC: 1 /hpf / WBC 19 /HPF   Sq Epi: x / Non Sq Epi: 11 /hpf / Bacteria: Negative        Microbiology: reviewed      Radiology: reviewed  < from: Xray Chest 1 View- PORTABLE-Urgent (21 @ 11:40) >    EXAM:  XR CHEST PORTABLE URGENT 1V                            PROCEDURE DATE:  2021            INTERPRETATION:  TIME OF EXAM: 2021 at 11:27 AM.    CLINICAL INFORMATION: Sepsis.    COMPARISON:  None available    TECHNIQUE:   AP Portable chest x-ray.    INTERPRETATION:    The heart is not enlarged. The mediastinum is not accurately evaluated on this image.  Elevated right hemidiaphragm.  Skin folds project over the right chest. There is linear atelectasis versus scar at the bases.The lungs are otherwise clear.  No pleural effusion or pneumothorax seen.  No acute bony abnormality noted.      IMPRESSION:  Limited right hemidiaphragm.    Linear atelectasis versus scar at the bases. The lungs are otherwise clear.                JASON AHUJA MD; Attending Radiologist  This document has been electronically signed. 2021 11:51AM    < end of copied text >

## 2021-04-14 PROBLEM — Z00.00 ENCOUNTER FOR PREVENTIVE HEALTH EXAMINATION: Status: ACTIVE | Noted: 2021-04-14

## 2021-04-14 LAB
ANION GAP SERPL CALC-SCNC: 14 MMOL/L — SIGNIFICANT CHANGE UP (ref 5–17)
BASOPHILS # BLD AUTO: 0 K/UL — SIGNIFICANT CHANGE UP (ref 0–0.2)
BASOPHILS NFR BLD AUTO: 0 % — SIGNIFICANT CHANGE UP (ref 0–2)
BUN SERPL-MCNC: 35 MG/DL — HIGH (ref 7–23)
CALCIUM SERPL-MCNC: 8.6 MG/DL — SIGNIFICANT CHANGE UP (ref 8.4–10.5)
CHLORIDE SERPL-SCNC: 97 MMOL/L — SIGNIFICANT CHANGE UP (ref 96–108)
CO2 SERPL-SCNC: 23 MMOL/L — SIGNIFICANT CHANGE UP (ref 22–31)
CREAT SERPL-MCNC: 1.38 MG/DL — HIGH (ref 0.5–1.3)
EOSINOPHIL # BLD AUTO: 0.11 K/UL — SIGNIFICANT CHANGE UP (ref 0–0.5)
EOSINOPHIL NFR BLD AUTO: 0.9 % — SIGNIFICANT CHANGE UP (ref 0–6)
GLUCOSE SERPL-MCNC: 99 MG/DL — SIGNIFICANT CHANGE UP (ref 70–99)
HCT VFR BLD CALC: 29.4 % — LOW (ref 39–50)
HGB BLD-MCNC: 9.6 G/DL — LOW (ref 13–17)
LACTATE SERPL-SCNC: 1.1 MMOL/L — SIGNIFICANT CHANGE UP (ref 0.7–2)
LYMPHOCYTES # BLD AUTO: 0.53 K/UL — LOW (ref 1–3.3)
LYMPHOCYTES # BLD AUTO: 4.3 % — LOW (ref 13–44)
MAGNESIUM SERPL-MCNC: 1.8 MG/DL — SIGNIFICANT CHANGE UP (ref 1.6–2.6)
MCHC RBC-ENTMCNC: 19 PG — LOW (ref 27–34)
MCHC RBC-ENTMCNC: 32.7 GM/DL — SIGNIFICANT CHANGE UP (ref 32–36)
MCV RBC AUTO: 58.2 FL — LOW (ref 80–100)
MONOCYTES # BLD AUTO: 0.97 K/UL — HIGH (ref 0–0.9)
MONOCYTES NFR BLD AUTO: 7.8 % — SIGNIFICANT CHANGE UP (ref 2–14)
NEUTROPHILS # BLD AUTO: 10.69 K/UL — HIGH (ref 1.8–7.4)
NEUTROPHILS NFR BLD AUTO: 86.1 % — HIGH (ref 43–77)
PHOSPHATE SERPL-MCNC: 1.4 MG/DL — LOW (ref 2.5–4.5)
PLATELET # BLD AUTO: 162 K/UL — SIGNIFICANT CHANGE UP (ref 150–400)
POTASSIUM SERPL-MCNC: 3.4 MMOL/L — LOW (ref 3.5–5.3)
POTASSIUM SERPL-SCNC: 3.4 MMOL/L — LOW (ref 3.5–5.3)
RBC # BLD: 5.05 M/UL — SIGNIFICANT CHANGE UP (ref 4.2–5.8)
RBC # FLD: 16.1 % — HIGH (ref 10.3–14.5)
SODIUM SERPL-SCNC: 134 MMOL/L — LOW (ref 135–145)
WBC # BLD: 12.41 K/UL — HIGH (ref 3.8–10.5)
WBC # FLD AUTO: 12.41 K/UL — HIGH (ref 3.8–10.5)

## 2021-04-14 PROCEDURE — 74176 CT ABD & PELVIS W/O CONTRAST: CPT | Mod: 26

## 2021-04-14 RX ORDER — SODIUM CHLORIDE 9 MG/ML
1000 INJECTION INTRAMUSCULAR; INTRAVENOUS; SUBCUTANEOUS
Refills: 0 | Status: DISCONTINUED | OUTPATIENT
Start: 2021-04-14 | End: 2021-04-16

## 2021-04-14 RX ORDER — SODIUM,POTASSIUM PHOSPHATES 278-250MG
1 POWDER IN PACKET (EA) ORAL
Refills: 0 | Status: COMPLETED | OUTPATIENT
Start: 2021-04-14 | End: 2021-04-16

## 2021-04-14 RX ORDER — POTASSIUM CHLORIDE 20 MEQ
40 PACKET (EA) ORAL ONCE
Refills: 0 | Status: COMPLETED | OUTPATIENT
Start: 2021-04-14 | End: 2021-04-14

## 2021-04-14 RX ADMIN — Medication 650 MILLIGRAM(S): at 21:46

## 2021-04-14 RX ADMIN — SODIUM CHLORIDE 70 MILLILITER(S): 9 INJECTION INTRAMUSCULAR; INTRAVENOUS; SUBCUTANEOUS at 12:29

## 2021-04-14 RX ADMIN — Medication 1 TABLET(S): at 04:50

## 2021-04-14 RX ADMIN — Medication 650 MILLIGRAM(S): at 00:30

## 2021-04-14 RX ADMIN — HEPARIN SODIUM 5000 UNIT(S): 5000 INJECTION INTRAVENOUS; SUBCUTANEOUS at 17:36

## 2021-04-14 RX ADMIN — HEPARIN SODIUM 5000 UNIT(S): 5000 INJECTION INTRAVENOUS; SUBCUTANEOUS at 04:51

## 2021-04-14 RX ADMIN — CEFTRIAXONE 100 MILLIGRAM(S): 500 INJECTION, POWDER, FOR SOLUTION INTRAMUSCULAR; INTRAVENOUS at 12:28

## 2021-04-14 RX ADMIN — Medication 650 MILLIGRAM(S): at 14:37

## 2021-04-14 RX ADMIN — LOSARTAN POTASSIUM 100 MILLIGRAM(S): 100 TABLET, FILM COATED ORAL at 04:50

## 2021-04-14 RX ADMIN — Medication 1 TABLET(S): at 17:36

## 2021-04-14 RX ADMIN — Medication 1 TABLET(S): at 12:29

## 2021-04-14 RX ADMIN — PANTOPRAZOLE SODIUM 40 MILLIGRAM(S): 20 TABLET, DELAYED RELEASE ORAL at 04:52

## 2021-04-14 RX ADMIN — Medication 650 MILLIGRAM(S): at 06:50

## 2021-04-14 RX ADMIN — Medication 650 MILLIGRAM(S): at 06:14

## 2021-04-14 RX ADMIN — Medication 40 MILLIEQUIVALENT(S): at 12:28

## 2021-04-14 RX ADMIN — SIMVASTATIN 20 MILLIGRAM(S): 20 TABLET, FILM COATED ORAL at 21:03

## 2021-04-14 RX ADMIN — Medication 650 MILLIGRAM(S): at 21:03

## 2021-04-14 NOTE — PROGRESS NOTE ADULT - ASSESSMENT
Pt is a 76M w/ PMHx of remote bladder CA, HTN, HLD p/w persistent fevers, Tm 101.4F and shortness of breath    Sepsis 2/2 Acute cystitis  Fevers  Leukocytosis  -UA+, UCx NGTD  -BCx NGTD  -renal sono reviewed  No previous cx to help guide therapy  -c/w ceftriaxone 1gm q24h for presumed UTI. Will plan for x7 day course until 4/18, can switch to cefpodoxime 200mg q12h to complete course.     MONALISA  -renally dose medications  -renal sono w/mild R hydronephrosis  -appreciate renal recs    Infectious Diseases will continue to follow. Please call with any questions.   Josephine Hardy M.D.  [facility], Division of Infectious Diseases [phone]  For over the weekend and after hours, please call [phone]

## 2021-04-14 NOTE — CONSULT NOTE ADULT - ASSESSMENT
76y Male with history of bladder CA presents with fevers. Nephrology consulted for elevated Scr.    1) MONALISA: likely hemodynamically mediated in setting of sepsis, losartan and HCTZ use now resolving. Baseline Scr unknown. UA with pyuria with negative urine culture which may be due to neobladder. Urine sodium low normal despite patient being on HCTZ. Renal US with unilateral hydro which should not contribute to MONALISA. Check LDH and haptoglobin r/o TMA (unlikely). Can give gentle IVF today X 1L. Avoid nephrotoxins.    2) HTN: BP controlled. Reasonable to continue with losartan/HCTZ given improving renal function. Monitor BP.    3) R hydronephrosis: Given h/o bladder CA s/p resection now with neobladder, would consider CT with non-contrast r/o stone.     4) Sepsis: Secondary to passed stone? CT A/P as above. Abx as per ID.      [facility] NEPHROLOGY  Beto Reynaga M.D.  Dylan Fitzgerald D.O.  Felisa Austin M.D.  Edna Ortega, MSN, ANP-C    Telephone: [phone]  Facsimile: [phone]    [address] [place] [address]
Pt is a 76M w/ PMHx of remote bladder CA, HTN, HLD p/w persistent fevers, Tm 101.4F and shortness of breath    Sepsis 2/2 Acute cystitis  Fevers  Leukocytosis  -UA+, UCx NGTD  -BCx pending  -renal sono  No previous cx to help guide therapy  -c/w ceftriaxone 1gm q24h for presumed UTI. Will plan for x7 day course until 4/18, can switch to cefpodoxime 200mg q12h to complete course.     MONALISA  -renally dose medications  -renal sono  -appreciate renal recs    Infectious Diseases will continue to follow. Please call with any questions.   Josephine Hardy M.D.  [facility], Division of Infectious Diseases [phone]  For over the weekend and after hours, please call [phone]

## 2021-04-14 NOTE — PROGRESS NOTE ADULT - ASSESSMENT
Patient is a 76 year old male with PMHx of remote bladder CA, HTN, HLD presenting for second visit to the ER since Saturday for persistent fevers and shortness of breath. Patient reports that he was having fevers up to 103F when he came to the ER on Saturday, was Covid swabbed and RVP was done and both were negative. Patient states that he received both Covid vaccines > 1mo ago and has not been in contact with anyone with infectious type symptoms or who recently traveled. He states that he has had persistent fevers last one being 101.4 at 9AM today. Patient took Tylenol but still not feeling well generally so he decided to come back today. He denies any cough, chest pain, nausea, vomiting, diarrhea. Denies any other new symptoms.    # Sepsis:  2' UTI  Covid swabs negative (from [facility] ED sat 4/10)  Rapid HR  WBC 18.50  Lactate 3.0, repeat 4.1  Resolved    # UTI:  Cont ceftriaxone  Will plan for x7 day course until 4/18, can switch to cefpodoxime 200mg q12h to complete course.   Blood and urine cultures (-) so far    # MONALISA:  Likely 2' dehydration  Renal U/S showing only mild rt hydronephrosis  Noncontrast CT abd/pelvis to look for residual stone  Cont PO fluids  Appreciate renal consult    # HTN:  Chronic, stable  Home meds: Losartan 100MG daily, Triamt HCTZ 37.5MG-25MG daily    # HLD:  Chronic, stable  Home med: Simvastatin 20MG QHS    # DVT ppx:  Heparin subq Q12H

## 2021-04-14 NOTE — CONSULT NOTE ADULT - SUBJECTIVE AND OBJECTIVE BOX
[facility] NEPHROLOGY- CONSULTATION NOTE    76y Male with history of below presents with fevers. Nephrology consulted for elevated Scr.    Patient with h/o bladder CA s/p resection now with neobladder. Patient denies any prior history of renal disease. Patient denies any nausea, vomiting or diarrhea PTA. Patient denies any NSAID use PTA. Scr improving since admission and patient with good PO intake.    Patient with fevers, R flank pain and dysuria now all resolved.    REVIEW OF SYSTEMS:  Gen: no changes in weight, + fevers  HEENT: no rhinorrhea  Neck: no sore throat  Cards: no chest pain  Resp: no dyspnea  GI: no nausea or vomiting or diarrhea  : no dysuria or hematuria  Vascular: no LE edema  Derm: no rashes  Neuro: no numbness/tingling    No Known Allergies      Home Medications Reviewed  Hospital Medications:   MEDICATIONS  (STANDING):  cefTRIAXone   IVPB 1000 milliGRAM(s) IV Intermittent every 24 hours  heparin   Injectable 5000 Unit(s) SubCutaneous every 12 hours  losartan 100 milliGRAM(s) Oral daily  pantoprazole    Tablet 40 milliGRAM(s) Oral before breakfast  simvastatin 20 milliGRAM(s) Oral at bedtime  triamterene 37.5 mG/hydrochlorothiazide 25 mG Tablet 1 Tablet(s) Oral daily      PAST MEDICAL & SURGICAL HISTORY:  No pertinent past medical history      FAMILY HISTORY:  N/C    SOCIAL HISTORY:  Denies toxic substance use       VITALS:  T(F): 98.8 (21 @ 04:48), Max: 99.1 (21 @ 20:46)  HR: 95 (21 @ 04:48)  BP: 130/82 (21 @ 04:48)  RR: 16 (21 @ 04:48)  SpO2: 96% (21 @ 04:48)  Wt(kg): --     @ 07:01  -   @ 07:00  --------------------------------------------------------  IN: 990 mL / OUT: 0 mL / NET: 990 mL        PHYSICAL EXAM:  Gen: NAD, calm  HEENT: MMM  Neck: no JVD  Cards: RRR, +S1/S2, no M/G/R  Resp: CTA B/L  GI: soft, NT/ND, NABS  : no CVA tenderness  Vascular: no LE edema B/L  Derm: no rashes  Neuro: non-focal      LABS:      134<L>  |  97  |  35<H>  ----------------------------<  99  3.4<L>   |  23  |  1.38<H>    Ca    8.6      2021 06:22  Phos  1.4       Mg     1.8         TPro  6.6  /  Alb  3.7  /  TBili  1.4<H>  /  DBili      /  AST  35  /  ALT  30  /  AlkPhos  79      Creatinine Trend: 1.38 <--, 1.70 <--, 2.00 <--                        9.6    12.41 )-----------( 162      ( 2021 06:24 )             29.4     Urine Studies:  Urinalysis Basic - ( 2021 02:15 )    Color: Yellow / Appearance: Slightly Turbid / S.012 / pH:   Gluc:  / Ketone: Negative  / Bili: Negative / Urobili: Negative   Blood:  / Protein: 100 / Nitrite: Negative   Leuk Esterase: Large / RBC: 1 /hpf / WBC 19 /HPF   Sq Epi:  / Non Sq Epi: 11 /hpf / Bacteria: Negative      Sodium, Random Urine: 33 mmol/L ( @ 02:15)  Osmolality, Random Urine: 313 mos/kg ( @ 02:15)  Potassium, Random Urine: 25 mmol/L ( @ 02:15)  Chloride, Random Urine: <35 mmol/L ( @ 02:15)      RADIOLOGY & ADDITIONAL STUDIES:    < from: US Kidney and Bladder (21 @ 14:07) >  IMPRESSION:    Mild right hydronephrosis.    < end of copied text >      < from: Xray Chest 1 View- PORTABLE-Urgent (21 @ 11:40) >  IMPRESSION:  Limited right hemidiaphragm.    Linear atelectasis versus scar at the bases. The lungs are otherwise clear.    < end of copied text >

## 2021-04-15 ENCOUNTER — TRANSCRIPTION ENCOUNTER (OUTPATIENT)
Age: 76
End: 2021-04-15

## 2021-04-15 LAB
ANION GAP SERPL CALC-SCNC: 11 MMOL/L — SIGNIFICANT CHANGE UP (ref 5–17)
BUN SERPL-MCNC: 29 MG/DL — HIGH (ref 7–23)
CALCIUM SERPL-MCNC: 8.6 MG/DL — SIGNIFICANT CHANGE UP (ref 8.4–10.5)
CHLORIDE SERPL-SCNC: 103 MMOL/L — SIGNIFICANT CHANGE UP (ref 96–108)
CO2 SERPL-SCNC: 25 MMOL/L — SIGNIFICANT CHANGE UP (ref 22–31)
CREAT SERPL-MCNC: 1.2 MG/DL — SIGNIFICANT CHANGE UP (ref 0.5–1.3)
GLUCOSE SERPL-MCNC: 87 MG/DL — SIGNIFICANT CHANGE UP (ref 70–99)
HAPTOGLOB SERPL-MCNC: 322 MG/DL — HIGH (ref 34–200)
HCT VFR BLD CALC: 29 % — LOW (ref 39–50)
HGB BLD-MCNC: 9.4 G/DL — LOW (ref 13–17)
LDH SERPL L TO P-CCNC: 220 U/L — SIGNIFICANT CHANGE UP (ref 50–242)
MCHC RBC-ENTMCNC: 19.1 PG — LOW (ref 27–34)
MCHC RBC-ENTMCNC: 32.4 GM/DL — SIGNIFICANT CHANGE UP (ref 32–36)
MCV RBC AUTO: 58.8 FL — LOW (ref 80–100)
NRBC # BLD: 0 /100 WBCS — SIGNIFICANT CHANGE UP (ref 0–0)
PLATELET # BLD AUTO: 182 K/UL — SIGNIFICANT CHANGE UP (ref 150–400)
POTASSIUM SERPL-MCNC: 4 MMOL/L — SIGNIFICANT CHANGE UP (ref 3.5–5.3)
POTASSIUM SERPL-SCNC: 4 MMOL/L — SIGNIFICANT CHANGE UP (ref 3.5–5.3)
RBC # BLD: 4.93 M/UL — SIGNIFICANT CHANGE UP (ref 4.2–5.8)
RBC # FLD: 16.1 % — HIGH (ref 10.3–14.5)
SODIUM SERPL-SCNC: 139 MMOL/L — SIGNIFICANT CHANGE UP (ref 135–145)
WBC # BLD: 8.66 K/UL — SIGNIFICANT CHANGE UP (ref 3.8–10.5)
WBC # FLD AUTO: 8.66 K/UL — SIGNIFICANT CHANGE UP (ref 3.8–10.5)

## 2021-04-15 RX ORDER — SODIUM CHLORIDE 0.65 %
1 AEROSOL, SPRAY (ML) NASAL
Qty: 1 | Refills: 0
Start: 2021-04-15 | End: 2021-04-28

## 2021-04-15 RX ORDER — TRIAMTERENE/HYDROCHLOROTHIAZID 75 MG-50MG
1 TABLET ORAL
Qty: 0 | Refills: 0 | DISCHARGE

## 2021-04-15 RX ORDER — CEFPODOXIME PROXETIL 100 MG
1 TABLET ORAL
Qty: 6 | Refills: 0
Start: 2021-04-15 | End: 2021-04-17

## 2021-04-15 RX ORDER — SIMVASTATIN 20 MG/1
1 TABLET, FILM COATED ORAL
Qty: 0 | Refills: 0 | DISCHARGE

## 2021-04-15 RX ORDER — OMEPRAZOLE 10 MG/1
1 CAPSULE, DELAYED RELEASE ORAL
Qty: 0 | Refills: 0 | DISCHARGE

## 2021-04-15 RX ORDER — ACETAMINOPHEN 500 MG
2 TABLET ORAL
Qty: 0 | Refills: 0 | DISCHARGE
Start: 2021-04-15

## 2021-04-15 RX ORDER — LOSARTAN POTASSIUM 100 MG/1
1 TABLET, FILM COATED ORAL
Qty: 0 | Refills: 0 | DISCHARGE

## 2021-04-15 RX ADMIN — Medication 1 TABLET(S): at 17:21

## 2021-04-15 RX ADMIN — Medication 650 MILLIGRAM(S): at 17:21

## 2021-04-15 RX ADMIN — Medication 1 TABLET(S): at 12:07

## 2021-04-15 RX ADMIN — Medication 1 TABLET(S): at 04:57

## 2021-04-15 RX ADMIN — HEPARIN SODIUM 5000 UNIT(S): 5000 INJECTION INTRAVENOUS; SUBCUTANEOUS at 17:21

## 2021-04-15 RX ADMIN — Medication 1 TABLET(S): at 10:13

## 2021-04-15 RX ADMIN — Medication 650 MILLIGRAM(S): at 04:57

## 2021-04-15 RX ADMIN — LOSARTAN POTASSIUM 100 MILLIGRAM(S): 100 TABLET, FILM COATED ORAL at 04:57

## 2021-04-15 RX ADMIN — Medication 650 MILLIGRAM(S): at 17:51

## 2021-04-15 RX ADMIN — SIMVASTATIN 20 MILLIGRAM(S): 20 TABLET, FILM COATED ORAL at 21:31

## 2021-04-15 RX ADMIN — CEFTRIAXONE 100 MILLIGRAM(S): 500 INJECTION, POWDER, FOR SOLUTION INTRAMUSCULAR; INTRAVENOUS at 12:07

## 2021-04-15 RX ADMIN — Medication 650 MILLIGRAM(S): at 05:30

## 2021-04-15 RX ADMIN — HEPARIN SODIUM 5000 UNIT(S): 5000 INJECTION INTRAVENOUS; SUBCUTANEOUS at 04:57

## 2021-04-15 RX ADMIN — PANTOPRAZOLE SODIUM 40 MILLIGRAM(S): 20 TABLET, DELAYED RELEASE ORAL at 04:56

## 2021-04-15 NOTE — DISCHARGE NOTE PROVIDER - NSDCCPCAREPLAN_GEN_ALL_CORE_FT
PRINCIPAL DISCHARGE DIAGNOSIS  Diagnosis: Sepsis secondary to UTI  Assessment and Plan of Treatment: Seen by Infectious disease  Complete course of oral antibiotic therapy      SECONDARY DISCHARGE DIAGNOSES  Diagnosis: MONALISA (acute kidney injury)  Assessment and Plan of Treatment: Creatinine stable    Diagnosis: Hydroureteronephrosis  Assessment and Plan of Treatment: Mild bilateral hydroureteronephrosis   Seen by Renal.     PRINCIPAL DISCHARGE DIAGNOSIS  Diagnosis: Sepsis secondary to UTI  Assessment and Plan of Treatment: Seen by Infectious disease  Complete course of oral antibiotic therapy      SECONDARY DISCHARGE DIAGNOSES  Diagnosis: MONALISA (acute kidney injury)  Assessment and Plan of Treatment: Creatinine stable    Diagnosis: Hydroureteronephrosis  Assessment and Plan of Treatment: Mild bilateral hydroureteronephrosis   Seen by Renal.  You will need repeat imaging with your urologist after treatment of infection.

## 2021-04-15 NOTE — DISCHARGE NOTE PROVIDER - NSDCFUADDAPPT_GEN_ALL_CORE_FT
Follow up with your Kidney doctor- you will need repeat imaging with his outpatient urologist after treatment of infection.  Follow up with your Primary care doctor within one week

## 2021-04-15 NOTE — DISCHARGE NOTE PROVIDER - NSDCMRMEDTOKEN_GEN_ALL_CORE_FT
acetaminophen 325 mg oral tablet: 2 tab(s) orally every 6 hours, As needed, Temp greater or equal to 38C (100.4F), Mild Pain (1 - 3)  cefpodoxime 200 mg oral tablet: 1 tab(s) orally every 12 hours   losartan 100 mg oral tablet: 1 tab(s) orally once a day  omeprazole 20 mg oral delayed release capsule: 1 cap(s) orally once a day  simvastatin 20 mg oral tablet: 1 tab(s) orally once a day (at bedtime)  sodium chloride 0.65% nasal spray: 1 spray(s) in each nostril once a day, As Needed  -for congestion   triamterene-hydrochlorothiazide 37.5 mg-25 mg oral tablet: 1 tab(s) orally once a day

## 2021-04-15 NOTE — PROGRESS NOTE ADULT - SUBJECTIVE AND OBJECTIVE BOX
Feels well  Ambulating well  Eating well    Vital Signs Last 24 Hrs  T(C): 37.4 (15 Apr 2021 04:52), Max: 37.4 (15 Apr 2021 04:52)  T(F): 99.3 (15 Apr 2021 04:52), Max: 99.3 (15 Apr 2021 04:52)  HR: 93 (15 Apr 2021 04:52) (93 - 99)  BP: 139/80 (15 Apr 2021 04:52) (103/67 - 139/80)  BP(mean): --  RR: 18 (15 Apr 2021 04:52) (18 - 18)  SpO2: 95% (15 Apr 2021 04:52) (95% - 96%)    I&O's Summary    04-14-21 @ 07:01  -  04-15-21 @ 07:00  --------------------------------------------------------  IN: 1050 mL / OUT: 0 mL / NET: 1050 mL    04-15-21 @ 07:01  -  04-15-21 @ 12:06  --------------------------------------------------------  IN: 240 mL / OUT: 0 mL / NET: 240 mL        GENERAL: NAD, well-developed, comfortable  HEAD:  Atraumatic, Normocephalic  EYES: EOMI, PERRLA, conjunctiva and sclera clear  NECK: Supple, No JVD  CHEST/LUNG: Clear to auscultation bilaterally; No wheeze  HEART: Regular rate and rhythm; No murmurs, rubs, or gallops  ABDOMEN: Soft, Nontender, Nondistended; Bowel sounds present  NEURO: AAOx3, no focal weakness, speech/comprehension are intact  EXTREMITIES:  2+ Peripheral Pulses, No clubbing, cyanosis, or edema  SKIN: No rashes or lesions    LABS:                        9.4    8.66  )-----------( 182      ( 15 Apr 2021 06:36 )             29.0     04-15    139  |  103  |  29<H>  ----------------------------<  87  4.0   |  25  |  1.20    Ca    8.6      15 Apr 2021 06:35  Phos  1.4     04-14  Mg     1.8     04-14        CAPILLARY BLOOD GLUCOSE                RADIOLOGY & ADDITIONAL TESTS:    Imaging Personally Reviewed:  [x] YES  [ ] NO    Consultant(s) Notes Reviewed:  [x] YES  [ ] NO    
[facility], Division of Infectious Diseases  SAM Tamayo Y. Patel, S. Shah  [phone]    Name: HAL SINGLETON  Age: 76y  Gender: Male  MRN: [id number]  Note Date: 21    Interval History:  Patient seen and examined at bedside this morning  No acute overnight events. Afebrile  Sleeping comfortably  Notes reviewed    Antibiotics:  cefTRIAXone   IVPB 1000 milliGRAM(s) IV Intermittent every 24 hours      Medications:  acetaminophen   Tablet .. 650 milliGRAM(s) Oral every 6 hours PRN  cefTRIAXone   IVPB 1000 milliGRAM(s) IV Intermittent every 24 hours  heparin   Injectable 5000 Unit(s) SubCutaneous every 12 hours  losartan 100 milliGRAM(s) Oral daily  pantoprazole    Tablet 40 milliGRAM(s) Oral before breakfast  potassium chloride    Tablet ER 40 milliEquivalent(s) Oral once  potassium phosphate / sodium phosphate Tablet (K-PHOS No. 2) 1 Tablet(s) Oral three times a day with meals  simvastatin 20 milliGRAM(s) Oral at bedtime  sodium chloride 0.65% Nasal 1 Spray(s) Both Nostrils four times a day PRN  sodium chloride 0.9%. 1000 milliLiter(s) IV Continuous <Continuous>  triamterene 37.5 mG/hydrochlorothiazide 25 mG Tablet 1 Tablet(s) Oral daily      Review of Systems:  unable to obtain    Allergies: No Known Allergies    For details regarding the patient's past medical history, social history, family history, and other miscellaneous elements, please refer the initial infectious diseases consultation and/or the admitting history and physical examination for this admission.    Objective:  Vitals:   T(C): 37.1 (21 @ 04:48), Max: 37.3 (21 @ 20:46)  HR: 95 (21 @ 04:48) (92 - 99)  BP: 130/82 (21 @ 04:48) (115/74 - 130/82)  RR: 16 (21 @ 04:48) (16 - 18)  SpO2: 96% (21 @ 04:48) (96% - 99%)    Physical Examination:  General: no acute distress  HEENT: NC/AT, EOMI, anicteric, no oral lesions  Neck: supple, no palpable LAD  Cardio: S1, S2 heard, RRR, no murmurs  Resp: breath sounds heard bilaterally, no rales, wheezes or rhonchi  Abd: soft, NT, ND, + bowel sounds  Ext: no edema or cyanosis  Skin: warm, dry, no visible rash      Laboratory Studies:  CBC:                       9.6    12.41 )-----------( 162      ( 2021 06:24 )             29.4     CMP:     134<L>  |  97  |  35<H>  ----------------------------<  99  3.4<L>   |  23  |  1.38<H>    Ca    8.6      2021 06:22  Phos  1.4       Mg     1.8             Urinalysis Basic - ( 2021 02:15 )    Color: Yellow / Appearance: Slightly Turbid / S.012 / pH: x  Gluc: x / Ketone: Negative  / Bili: Negative / Urobili: Negative   Blood: x / Protein: 100 / Nitrite: Negative   Leuk Esterase: Large / RBC: 1 /hpf / WBC 19 /HPF   Sq Epi: x / Non Sq Epi: 11 /hpf / Bacteria: Negative      Microbiology: reviewed    Culture - Urine (collected 21 @ 15:03)  Source: .Urine Clean Catch (Midstream)  Final Report (21 @ 11:50):    No growth    Culture - Blood (collected 21 @ 15:01)  Source: .Blood Blood-Peripheral  Preliminary Report (21 @ 16:01):    No growth to date.    Culture - Blood (collected 21 @ 15:01)  Source: .Blood Blood-Peripheral  Preliminary Report (21 @ 16:01):    No growth to date.        Radiology: reviewed  < from: US Kidney and Bladder (21 @ 14:07) >    EXAM:  US KIDNEYS AND BLADDER                            PROCEDURE DATE:  2021            INTERPRETATION:  CLINICAL INFORMATION: Acute kidney injury.    COMPARISON: None available.    TECHNIQUE: Sonography of the kidneys and bladder.    FINDINGS:    Right kidney: 12.3 cm. Mild hydronephrosis.    Left kidney: 12.3 cm. No hydronephrosis.    Urinary bladder: Mildly trabeculated bladder wall.    Miscellaneous: Increased echogenicity of the visualized liver, suggestive of hepatic steatosis.    IMPRESSION:    Mild right hydronephrosis.                      EMMA NATION MD; Attending Radiologist  This document has been electronically signed. 2021  2:14PM    < end of copied text >      
[facility], Division of Infectious Diseases  SAM Tamayo Y. Patel, S. Shah  [phone]    Name: HAL SINGLETON  Age: 76y  Gender: Male  MRN: [id number]  Note Date: 04-15-21    Interval History:  Patient seen and examined at bedside this morning  No acute overnight events. Afebrile  No complaints  Notes reviewed    Antibiotics:  cefTRIAXone   IVPB 1000 milliGRAM(s) IV Intermittent every 24 hours      Medications:  acetaminophen   Tablet .. 650 milliGRAM(s) Oral every 6 hours PRN  cefTRIAXone   IVPB 1000 milliGRAM(s) IV Intermittent every 24 hours  heparin   Injectable 5000 Unit(s) SubCutaneous every 12 hours  losartan 100 milliGRAM(s) Oral daily  pantoprazole    Tablet 40 milliGRAM(s) Oral before breakfast  potassium phosphate / sodium phosphate Tablet (K-PHOS No. 2) 1 Tablet(s) Oral three times a day with meals  simvastatin 20 milliGRAM(s) Oral at bedtime  sodium chloride 0.65% Nasal 1 Spray(s) Both Nostrils four times a day PRN  sodium chloride 0.9%. 1000 milliLiter(s) IV Continuous <Continuous>  triamterene 37.5 mG/hydrochlorothiazide 25 mG Tablet 1 Tablet(s) Oral daily      Review of Systems:  A 10-point review of systems was obtained.     Pertinent positives and negatives--  Constitutional: No fevers. No Chills. No Rigors.   Cardiovascular: No chest pain. No palpitations.  Respiratory: No shortness of breath. No cough.  Gastrointestinal: No nausea or vomiting. No diarrhea or constipation.   Psychiatric: Pleasant. Appropriate affect.    Review of systems otherwise negative except as previously noted.    Allergies: No Known Allergies    For details regarding the patient's past medical history, social history, family history, and other miscellaneous elements, please refer the initial infectious diseases consultation and/or the admitting history and physical examination for this admission.    Objective:  Vitals:   T(C): 37.4 (04-15-21 @ 04:52), Max: 37.4 (04-15-21 @ 04:52)  HR: 93 (04-15-21 @ 04:52) (93 - 99)  BP: 139/80 (04-15-21 @ 04:52) (103/67 - 139/80)  RR: 18 (04-15-21 @ 04:52) (18 - 18)  SpO2: 95% (04-15-21 @ 04:52) (95% - 96%)    Physical Examination:  General: no acute distress  HEENT: NC/AT, EOMI, anicteric, no oral lesions  Neck: supple, no palpable LAD  Cardio: S1, S2 heard, RRR, no murmurs  Resp: breath sounds heard bilaterally, no rales, wheezes or rhonchi  Abd: soft, NT, ND, + bowel sounds  Neuro: AAOx3, no obvious focal deficits  Ext: no edema or cyanosis  Skin: warm, dry, no visible rash    Laboratory Studies:  CBC:                       9.4    8.66  )-----------( 182      ( 15 Apr 2021 06:36 )             29.0     CMP: 04-15    139  |  103  |  29<H>  ----------------------------<  87  4.0   |  25  |  1.20    Ca    8.6      15 Apr 2021 06:35  Phos  1.4     04-14  Mg     1.8     04-14          Microbiology: reviewed    Culture - Urine (collected 04-12-21 @ 15:03)  Source: .Urine Clean Catch (Midstream)  Final Report (04-13-21 @ 11:50):    No growth    Culture - Blood (collected 04-12-21 @ 15:01)  Source: .Blood Blood-Peripheral  Preliminary Report (04-13-21 @ 16:01):    No growth to date.    Culture - Blood (collected 04-12-21 @ 15:01)  Source: .Blood Blood-Peripheral  Preliminary Report (04-13-21 @ 16:01):    No growth to date.        Radiology: reviewed      
He says that he feels better in terms of malaise  No fevers last night    Vital Signs Last 24 Hrs  T(C): 37.1 (2021 12:33), Max: 38.2 (2021 17:56)  T(F): 98.8 (2021 12:33), Max: 100.8 (2021 17:56)  HR: 92 (2021 12:33) (92 - 116)  BP: 115/74 (:33) (98/60 - 125/71)  BP(mean): 73 (2021 17:56) (73 - 73)  RR: 18 (:33) (18 - 20)  SpO2: 98% (:33) (95% - 98%)    I&O's Summary    21 @ 07:01  -  21 @ 07:00  --------------------------------------------------------  IN: 300 mL / OUT: 0 mL / NET: 300 mL    21 @ 07:21 @ 17:31  --------------------------------------------------------  IN: 240 mL / OUT: 0 mL / NET: 240 mL        GENERAL: NAD, well-developed, comfortable  HEAD:  Atraumatic, Normocephalic  EYES: EOMI, PERRLA, conjunctiva and sclera clear  NECK: Supple, No JVD  CHEST/LUNG: Clear to auscultation bilaterally; No wheeze  HEART: Regular rate and rhythm; No murmurs, rubs, or gallops  ABDOMEN: Soft, Nontender, Nondistended; Bowel sounds present  NEURO: AAOx3, no focal weakness, 5/5 b/l extremity strength, b/l knee no arthritis, no effusion   EXTREMITIES:  2+ Peripheral Pulses, No clubbing, cyanosis, or edema  SKIN: No rashes or lesions    LABS:                        9.5    13.42 )-----------( 125      ( 2021 07:16 )             28.7     04-13    132<L>  |  96  |  42<H>  ----------------------------<  110<H>  2.8<LL>   |  21<L>  |  1.70<H>    Ca    7.9<L>      2021 07:16    TPro  6.6  /  Alb  3.7  /  TBili  1.4<H>  /  DBili  x   /  AST  35  /  ALT  30  /  AlkPhos  79  04-12    PT/INR - ( 2021 11:05 )   PT: 13.6 sec;   INR: 1.14 ratio         PTT - ( 2021 11:05 )  PTT:29.5 sec  CAPILLARY BLOOD GLUCOSE            Urinalysis Basic - ( 2021 02:15 )    Color: Yellow / Appearance: Slightly Turbid / S.012 / pH: x  Gluc: x / Ketone: Negative  / Bili: Negative / Urobili: Negative   Blood: x / Protein: 100 / Nitrite: Negative   Leuk Esterase: Large / RBC: 1 /hpf / WBC 19 /HPF   Sq Epi: x / Non Sq Epi: 11 /hpf / Bacteria: Negative        RADIOLOGY & ADDITIONAL TESTS:    Imaging Personally Reviewed:  [x] YES  [ ] NO    Consultant(s) Notes Reviewed:  [x] YES  [ ] NO
Spoke with wife  Pt without fevers, rigors or chills  No hematuria    Vital Signs Last 24 Hrs  T(C): 36.5 (2021 12:27), Max: 37.3 (2021 20:46)  T(F): 97.7 (2021 12:27), Max: 99.1 (2021 20:46)  HR: 99 (:27) (95 - 99)  BP: 103/67 (:27) (103/67 - 130/82)  BP(mean): --  RR: 18 (:27) (16 - 18)  SpO2: 95% (:) (95% - 99%)    I&O's Summary    21 @ 07:01  -  21 @ 07:00  --------------------------------------------------------  IN: 990 mL / OUT: 0 mL / NET: 990 mL    21 @ 07:01  -  21 @ 13:27  --------------------------------------------------------  IN: 240 mL / OUT: 0 mL / NET: 240 mL        GENERAL: NAD, well-developed, comfortable  HEAD:  Atraumatic, Normocephalic  EYES: EOMI, PERRLA, conjunctiva and sclera clear  NECK: Supple, No JVD  CHEST/LUNG: Clear to auscultation bilaterally; No wheeze  HEART: Regular rate and rhythm; No murmurs, rubs, or gallops  ABDOMEN: Soft, Nontender, Nondistended; Bowel sounds present  NEURO: AAOx3, no focal weakness, speech/comprehension are intact  EXTREMITIES:  2+ Peripheral Pulses, No clubbing, cyanosis, or edema  SKIN: No rashes or lesions    LABS:                        9.6    12.41 )-----------( 162      ( 2021 06:24 )             29.4     04-14    134<L>  |  97  |  35<H>  ----------------------------<  99  3.4<L>   |  23  |  1.38<H>    Ca    8.6      2021 06:22  Phos  1.4     04-14  Mg     1.8     04-14        CAPILLARY BLOOD GLUCOSE            Urinalysis Basic - ( 2021 02:15 )    Color: Yellow / Appearance: Slightly Turbid / S.012 / pH: x  Gluc: x / Ketone: Negative  / Bili: Negative / Urobili: Negative   Blood: x / Protein: 100 / Nitrite: Negative   Leuk Esterase: Large / RBC: 1 /hpf / WBC 19 /HPF   Sq Epi: x / Non Sq Epi: 11 /hpf / Bacteria: Negative        RADIOLOGY & ADDITIONAL TESTS:    Imaging Personally Reviewed:  [x] YES  [ ] NO    Consultant(s) Notes Reviewed:  [x] YES  [ ] NO  
[facility] NEPHROLOGY- PROGRESS NOTE    76y Male with history of bladder CA presents with fevers. Nephrology consulted for elevated Scr.    REVIEW OF SYSTEMS:  Gen: no changes in weight  Cards: no chest pain  Resp: no dyspnea  GI: no nausea or vomiting or diarrhea  Vascular: no LE edema    No Known Allergies      Hospital Medications: Medications reviewed    VITALS:  T(F): 99.3 (04-15-21 @ 04:52), Max: 99.3 (04-15-21 @ 04:52)  HR: 93 (04-15-21 @ 04:52)  BP: 139/80 (04-15-21 @ 04:52)  RR: 18 (04-15-21 @ 04:52)  SpO2: 95% (04-15-21 @ 04:52)  Wt(kg): --  Height (cm): 172.7 ( @ 10:27), 172.7 (04-10 @ 17:40)  Weight (kg): 81.6 ( @ 10:27), 81.6 (04-10 @ 17:40)  BMI (kg/m2): 27.4 ( @ 10:27), 27.4 (04-10 @ 17:40)  BSA (m2): 1.95 ( @ 10:27), 1.95 (04-10 @ 17:40)     @ 07:01  -  04-15 @ 07:00  --------------------------------------------------------  IN: 1050 mL / OUT: 0 mL / NET: 1050 mL    04-15 @ 07:01  -  04-15 @ 13:05  --------------------------------------------------------  IN: 240 mL / OUT: 0 mL / NET: 240 mL        PHYSICAL EXAM:    Gen: NAD, calm  Cards: RRR, +S1/S2, no M/G/R  Resp: CTA B/L  GI: soft, NT/ND, NABS  Vascular: no LE edema B/L    LABS:  04-15    139  |  103  |  29<H>  ----------------------------<  87  4.0   |  25  |  1.20    Ca    8.6      15 Apr 2021 06:35  Phos  1.4     04-14  Mg     1.8     04-14      Creatinine Trend: 1.20 <--, 1.38 <--, 1.70 <--, 2.00 <--                        9.4    8.66  )-----------( 182      ( 15 Apr 2021 06:36 )             29.0     Urine Studies:  Urinalysis Basic - ( 2021 02:15 )    Color: Yellow / Appearance: Slightly Turbid / S.012 / pH:   Gluc:  / Ketone: Negative  / Bili: Negative / Urobili: Negative   Blood:  / Protein: 100 / Nitrite: Negative   Leuk Esterase: Large / RBC: 1 /hpf / WBC 19 /HPF   Sq Epi:  / Non Sq Epi: 11 /hpf / Bacteria: Negative      Sodium, Random Urine: 33 mmol/L ( @ 02:15)  Osmolality, Random Urine: 313 mos/kg ( @ 02:15)  Potassium, Random Urine: 25 mmol/L ( @ 02:15)  Chloride, Random Urine: <35 mmol/L ( @ 02:15)      RADIOLOGY & ADDITIONAL STUDIES:    < from: CT Abdomen and Pelvis No Cont (21 @ 17:18) >  IMPRESSION:  Patient is status post radical cystectomy and neobladder creation. Mild bilateral hydroureteronephrosis without visualized renal stone. Mild bilateral perinephric fatty stranding can be secondary to superimposed infection, in the right clinical setting.    < end of copied text >

## 2021-04-15 NOTE — DISCHARGE NOTE PROVIDER - HOSPITAL COURSE
Pt is a 76M w/ PMHx of remote bladder CA, HTN, HLD p/w persistent fevers, Tm 101.4F and shortness of breath    Sepsis 2/2 Acute cystitis  Fevers-resolved  Leukocytosis-resolved  -UA+, UCx NGTD  -BCx NGTD  -renal sono reviewed  No previous cx to help guide therapy  -s/p  ceftriaxone 1gm q24h for presumed UTI. plan for x7 day course until 4/18, switch to cefpodoxime 200mg q12h to complete course.     MONALISA- Seen by Renal:   MONALISA: likely hemodynamically mediated in setting of sepsis, losartan and HCTZ use now resolving. Baseline Scr unknown. UA with pyuria with negative urine culture which may be due to neobladder. Urine sodium low normal despite patient being on HCTZ. Renal US with unilateral hydro which should not contribute to MONALISA. Check LDH and haptoglobin r/o TMA (unlikely). Can give gentle IVF today X 1L. Avoid nephrotoxins.    2) HTN: BP controlled. Reasonable to continue with losartan/HCTZ given improving renal function. Monitor BP.    3) Mild BL hydroureteronephrosis on CT: Given h/o bladder CA s/p resection now with neobladder, CT - neg for renal stone. Possibly passed stone.       Pt medically cleared for discharge to home

## 2021-04-15 NOTE — DISCHARGE NOTE PROVIDER - CARE PROVIDER_API CALL
TEJA LIRIANO  Cardiovascular Diseases  [address] [place] [address]  Phone: [phone]  Fax: [phone]  Established Patient  Follow Up Time: 1 week

## 2021-04-15 NOTE — PROGRESS NOTE ADULT - ASSESSMENT
76y Male with history of bladder CA presents with fevers. Nephrology consulted for elevated Scr.    1) MONALISA: likely hemodynamically mediated in setting of sepsis, losartan and HCTZ use now resolving s/p IVF. Baseline Scr unknown. UA with pyuria with negative urine culture which may be due to neobladder. Urine sodium low normal despite patient being on HCTZ. CT with mild bilateral hydronephrosis of unclear etiology for which patient would need repeat imaging with his outpatient urologist after treatment of infection. TMA work up negative. Avoid nephrotoxins.    2) HTN: BP controlled. Reasonable to continue with losartan/HCTZ given improving renal function. Monitor BP.    3) R hydronephrosis: CT revealing bilateral hydronephrosis. Patient would need repeat imaging with his outpatient urologist after treatment of infection.    4) Sepsis: Secondary to  source. Abx as per ID.      [facility] NEPHROLOGY  Beto Reynaga M.D.  Dylan Fitzgerald D.O.  Felisa Austin M.D.  Edna Ortega, MSN, ANP-C    Telephone: [phone]  Facsimile: [phone]    [id number] [id number]

## 2021-04-15 NOTE — PROGRESS NOTE ADULT - ASSESSMENT
Patient is a 76 year old male with PMHx of remote bladder CA, HTN, HLD presenting for second visit to the ER since Saturday for persistent fevers and shortness of breath. Patient reports that he was having fevers up to 103F when he came to the ER on Saturday, was Covid swabbed and RVP was done and both were negative. Patient states that he received both Covid vaccines > 1mo ago and has not been in contact with anyone with infectious type symptoms or who recently traveled. He states that he has had persistent fevers last one being 101.4 at 9AM today. Patient took Tylenol but still not feeling well generally so he decided to come back today. He denies any cough, chest pain, nausea, vomiting, diarrhea. Denies any other new symptoms.    # Sepsis:  2' UTI  Covid swabs negative (from [facility] ED sat 4/10)  Rapid HR  WBC 18.50  Lactate 3.0, repeat 4.1  Resolved    # UTI:  Cont ceftriaxone  Will plan for x7 day course until 4/18, can switch to cefpodoxime 200mg q12h to complete course.   Blood and urine cultures (-) so far    # MONALISA:  Likely 2' dehydration  Renal U/S showing only mild rt hydronephrosis  Noncontrast CT abd/pelvis (-) for stone  Cont PO fluids  Appreciate renal consult    # HTN:  Chronic, stable  Home meds: Losartan 100MG daily, Triamt HCTZ 37.5MG-25MG daily    # HLD:  Chronic, stable  Home med: Simvastatin 20MG QHS    # DVT ppx:  Heparin subq Q12H    # Dispostion:  D/c to home 4/16/21 Patient is a 76 year old male with PMHx of remote bladder CA, HTN, HLD presenting for second visit to the ER since Saturday for persistent fevers and shortness of breath. Patient reports that he was having fevers up to 103F when he came to the ER on Saturday, was Covid swabbed and RVP was done and both were negative. Patient states that he received both Covid vaccines > 1mo ago and has not been in contact with anyone with infectious type symptoms or who recently traveled. He states that he has had persistent fevers last one being 101.4 at 9AM today. Patient took Tylenol but still not feeling well generally so he decided to come back today. He denies any cough, chest pain, nausea, vomiting, diarrhea. Denies any other new symptoms.    # Sepsis:  2' UTI  Covid swabs negative (from [facility] ED sat 4/10)  Rapid HR  WBC 18.50  Lactate 3.0, repeat 4.1  Resolved    # UTI:  Cont ceftriaxone  Will plan for x7 day course until 4/18, can switch to cefpodoxime 200mg q12h to complete course.   Blood and urine cultures (-) so far    # MONALISA:  Likely 2' dehydration  Renal U/S showing only mild rt hydronephrosis  Noncontrast CT abd/pelvis (-) for stone  Cont PO fluids  Appreciate renal consult    # HTN:  Chronic, stable  Home meds: Losartan 100MG daily, Triamt HCTZ 37.5MG-25MG daily    # HLD:  Chronic, stable  Home med: Simvastatin 20MG QHS    # DVT ppx:  Heparin subq Q12H    # Disposition:  D/c to home 4/16/21

## 2021-04-15 NOTE — PROGRESS NOTE ADULT - PROVIDER SPECIALTY LIST ADULT
Internal Medicine
Infectious Disease
Internal Medicine
Infectious Disease
Internal Medicine
Nephrology

## 2021-04-15 NOTE — PROGRESS NOTE ADULT - ASSESSMENT
Pt is a 76M w/ PMHx of remote bladder CA, HTN, HLD p/w persistent fevers, Tm 101.4F and shortness of breath    Sepsis 2/2 Acute cystitis  Fevers  Leukocytosis  -UA+, UCx NGTD  -BCx NGTD  -renal sono reviewed  No previous cx to help guide therapy  -c/w ceftriaxone 1gm q24h for presumed UTI. Will plan for x7 day course until 4/18, can switch to cefpodoxime 200mg q12h to complete course.     MONALISA  -renally dose medications  -renal sono w/mild R hydronephrosis  -appreciate renal recs    Infectious Diseases will s/o at this time, please call back with any questions  Josephine Hardy M.D.  [facility], Division of Infectious Diseases [phone]  For over the weekend and after hours, please call [phone]     Pt is a 76M w/ PMHx of remote bladder CA, HTN, HLD p/w persistent fevers, Tm 101.4F and shortness of breath    Sepsis 2/2 Acute cystitis  Fevers-resolved  Leukocytosis-resolved  -UA+, UCx NGTD  -BCx NGTD  -renal sono reviewed  No previous cx to help guide therapy  -c/w ceftriaxone 1gm q24h for presumed UTI. Will plan for x7 day course until 4/18, can switch to cefpodoxime 200mg q12h to complete course.     MONALISA  -renally dose medications  -renal sono w/mild R hydronephrosis  -appreciate renal recs    Infectious Diseases will s/o at this time, please call back with any questions  Josephine Hardy M.D.  [facility], Division of Infectious Diseases [phone]  For over the weekend and after hours, please call [phone]

## 2021-04-16 ENCOUNTER — TRANSCRIPTION ENCOUNTER (OUTPATIENT)
Age: 76
End: 2021-04-16

## 2021-04-16 VITALS
HEART RATE: 100 BPM | DIASTOLIC BLOOD PRESSURE: 75 MMHG | OXYGEN SATURATION: 97 % | RESPIRATION RATE: 18 BRPM | TEMPERATURE: 98 F | SYSTOLIC BLOOD PRESSURE: 114 MMHG

## 2021-04-16 PROCEDURE — 84100 ASSAY OF PHOSPHORUS: CPT

## 2021-04-16 PROCEDURE — 85610 PROTHROMBIN TIME: CPT

## 2021-04-16 PROCEDURE — 83615 LACTATE (LD) (LDH) ENZYME: CPT

## 2021-04-16 PROCEDURE — 82435 ASSAY OF BLOOD CHLORIDE: CPT

## 2021-04-16 PROCEDURE — 99283 EMERGENCY DEPT VISIT LOW MDM: CPT | Mod: 25

## 2021-04-16 PROCEDURE — 0225U NFCT DS DNA&RNA 21 SARSCOV2: CPT

## 2021-04-16 PROCEDURE — 85014 HEMATOCRIT: CPT

## 2021-04-16 PROCEDURE — 83735 ASSAY OF MAGNESIUM: CPT

## 2021-04-16 PROCEDURE — 85027 COMPLETE CBC AUTOMATED: CPT

## 2021-04-16 PROCEDURE — 84132 ASSAY OF SERUM POTASSIUM: CPT

## 2021-04-16 PROCEDURE — 83605 ASSAY OF LACTIC ACID: CPT

## 2021-04-16 PROCEDURE — 76770 US EXAM ABDO BACK WALL COMP: CPT

## 2021-04-16 PROCEDURE — 74176 CT ABD & PELVIS W/O CONTRAST: CPT

## 2021-04-16 PROCEDURE — 85025 COMPLETE CBC W/AUTO DIFF WBC: CPT

## 2021-04-16 PROCEDURE — 82436 ASSAY OF URINE CHLORIDE: CPT

## 2021-04-16 PROCEDURE — 81001 URINALYSIS AUTO W/SCOPE: CPT

## 2021-04-16 PROCEDURE — 85379 FIBRIN DEGRADATION QUANT: CPT

## 2021-04-16 PROCEDURE — 82803 BLOOD GASES ANY COMBINATION: CPT

## 2021-04-16 PROCEDURE — 71045 X-RAY EXAM CHEST 1 VIEW: CPT

## 2021-04-16 PROCEDURE — 83010 ASSAY OF HAPTOGLOBIN QUANT: CPT

## 2021-04-16 PROCEDURE — 80048 BASIC METABOLIC PNL TOTAL CA: CPT

## 2021-04-16 PROCEDURE — 84295 ASSAY OF SERUM SODIUM: CPT

## 2021-04-16 PROCEDURE — 80053 COMPREHEN METABOLIC PANEL: CPT

## 2021-04-16 PROCEDURE — 85730 THROMBOPLASTIN TIME PARTIAL: CPT

## 2021-04-16 PROCEDURE — 86769 SARS-COV-2 COVID-19 ANTIBODY: CPT

## 2021-04-16 PROCEDURE — 36000 PLACE NEEDLE IN VEIN: CPT

## 2021-04-16 PROCEDURE — 85018 HEMOGLOBIN: CPT

## 2021-04-16 PROCEDURE — 82330 ASSAY OF CALCIUM: CPT

## 2021-04-16 PROCEDURE — 87040 BLOOD CULTURE FOR BACTERIA: CPT

## 2021-04-16 PROCEDURE — 84133 ASSAY OF URINE POTASSIUM: CPT

## 2021-04-16 PROCEDURE — 83935 ASSAY OF URINE OSMOLALITY: CPT

## 2021-04-16 PROCEDURE — 82947 ASSAY GLUCOSE BLOOD QUANT: CPT

## 2021-04-16 PROCEDURE — 87086 URINE CULTURE/COLONY COUNT: CPT

## 2021-04-16 PROCEDURE — 84300 ASSAY OF URINE SODIUM: CPT

## 2021-04-16 RX ADMIN — LOSARTAN POTASSIUM 100 MILLIGRAM(S): 100 TABLET, FILM COATED ORAL at 05:36

## 2021-04-16 RX ADMIN — Medication 1 TABLET(S): at 09:46

## 2021-04-16 RX ADMIN — HEPARIN SODIUM 5000 UNIT(S): 5000 INJECTION INTRAVENOUS; SUBCUTANEOUS at 05:36

## 2021-04-16 RX ADMIN — PANTOPRAZOLE SODIUM 40 MILLIGRAM(S): 20 TABLET, DELAYED RELEASE ORAL at 06:05

## 2021-04-16 RX ADMIN — Medication 650 MILLIGRAM(S): at 00:55

## 2021-04-16 RX ADMIN — Medication 650 MILLIGRAM(S): at 01:25

## 2021-04-16 RX ADMIN — Medication 1 TABLET(S): at 05:36

## 2021-04-16 NOTE — DISCHARGE NOTE NURSING/CASE MANAGEMENT/SOCIAL WORK - PATIENT PORTAL LINK FT
You can access the FollowMyHealth Patient Portal offered by [facility] by registering at the following website: [email or web address]. By joining [facility]’s FollowMyHealth portal, you will also be able to view your health information using other applications (apps) compatible with our system.

## 2021-04-17 LAB
CULTURE RESULTS: SIGNIFICANT CHANGE UP
CULTURE RESULTS: SIGNIFICANT CHANGE UP
SPECIMEN SOURCE: SIGNIFICANT CHANGE UP
SPECIMEN SOURCE: SIGNIFICANT CHANGE UP

## 2022-04-15 ENCOUNTER — APPOINTMENT (OUTPATIENT)
Dept: PAIN MANAGEMENT | Facility: CLINIC | Age: 77
End: 2022-04-15
Payer: MEDICARE

## 2022-04-15 VITALS — BODY MASS INDEX: 29.91 KG/M2 | WEIGHT: 197.38 LBS | HEIGHT: 68 IN

## 2022-04-15 PROCEDURE — 99213 OFFICE O/P EST LOW 20 MIN: CPT

## 2022-04-15 NOTE — PHYSICAL EXAM
[Left] : left knee [] : lateral joint line tenderness [NL (0)] : extension 0 degrees [FreeTextEntry3] : Atrophy due to previous injury [TWNoteComboBox7] : flexion 90 degrees

## 2022-04-15 NOTE — ASSESSMENT
[FreeTextEntry1] : After discussing various treatment options with the patient including but not limited to oral medications, physical therapy, exercise, modalities as well as interventional spinal injections, we have decided with the following plan:\par f/u PRN

## 2022-04-15 NOTE — HISTORY OF PRESENT ILLNESS
[7] : 7 [3] : 3 [Throbbing] : throbbing [Intermittent] : intermittent [Meds] : meds [Walking] : walking [] : no [FreeTextEntry1] : Left knee

## 2022-12-23 ENCOUNTER — APPOINTMENT (OUTPATIENT)
Dept: PAIN MANAGEMENT | Facility: CLINIC | Age: 77
End: 2022-12-23

## 2022-12-23 VITALS — BODY MASS INDEX: 29.86 KG/M2 | WEIGHT: 197 LBS | HEIGHT: 68 IN

## 2022-12-23 DIAGNOSIS — Z78.9 OTHER SPECIFIED HEALTH STATUS: ICD-10-CM

## 2022-12-23 PROCEDURE — J3490N: CUSTOM

## 2022-12-23 PROCEDURE — 20610 DRAIN/INJ JOINT/BURSA W/O US: CPT | Mod: RT

## 2022-12-23 PROCEDURE — 99214 OFFICE O/P EST MOD 30 MIN: CPT | Mod: 25

## 2022-12-23 PROCEDURE — 73560 X-RAY EXAM OF KNEE 1 OR 2: CPT | Mod: RT

## 2022-12-23 NOTE — PHYSICAL EXAM
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

## 2022-12-23 NOTE — ASSESSMENT
[FreeTextEntry1] : After discussing various treatment options with the patient including but not limited to oral medications, physical therapy, exercise, modalities as well as interventional spinal injections, we have decided with the following plan:\par \par 1) right knee CSI today. if no relief consider Visco vs genicular.

## 2022-12-23 NOTE — PROCEDURE
[Large Joint Injection] : Large joint injection [Right] : of the right [Knee] : knee [Pain] : pain [Inflammation] : inflammation [X-ray evidence of Osteoarthritis on this or prior visit] : x-ray evidence of Osteoarthritis on this or prior visit [Alcohol] : alcohol [Betadine] : betadine [Ethyl Chloride sprayed topically] : ethyl chloride sprayed topically [Sterile technique used] : sterile technique used [___ cc    0.25%] : Bupivacaine (Marcaine) ~Vcc of 0.25%  [___ cc    40mg] : Triamcinolone (Kenalog) ~Vcc of 40 mg  [Call if redness, pain or fever occur] : call if redness, pain or fever occur [Apply ice for 15min out of every hour for the next 12-24 hours as tolerated] : apply ice for 15 minutes out of every hour for the next 12-24 hours as tolerated [Previous OTC use and PT nontherapeutic] : patient has tried OTC's including aspirin, Ibuprofen, Aleve, etc or prescription NSAIDS, and/or exercises at home and/or physical therapy without satisfactory response [Patient had decreased mobility in the joint] : patient had decreased mobility in the joint [Risks, benefits, alternatives discussed / Verbal consent obtained] : the risks benefits, and alternatives have been discussed, and verbal consent was obtained

## 2022-12-23 NOTE — HISTORY OF PRESENT ILLNESS
[Throbbing] : throbbing [Intermittent] : intermittent [Meds] : meds [Walking] : walking [4] : 4 [1] : 2 [Rest] : rest [Injection therapy] : injection therapy [] : no [FreeTextEntry6] : numbness  [FreeTextEntry1] : 12/23/22- Has pain in right knee now.  Pain is lateral and intermittent.  Left knee has improved from RFA.  Never has a CSI or gel injections into right knee.  Will get xray\par \par 04/15/2022: follow up today after left genicular RFA on 4/1/22.   Has 80% relief so far\par \par 1/28/22: follow up today after left genicular nerve block on 1/14/22. Had 80% relief. Will schedule RFA.\par 12/17/21: follow up today. His pain in the left knee is starting to recur in the anterior knee. pain when ascending and\par descending stairs.\par 7/23/21: follow up today after left genicular nerve block on 7/9/21. Pain much improved following. still with some mild\par pain around the anterior knee. reports an overall 80% improvement.\par 6/25/21: Patient complains of left knee pain. Had knee replaced 3 years ago (for OA) by Dr. Mae at [facility]. Knee\par always hurt since. pain was different following. Saw the surgeon and said knee replacement looked good. Patient was\par referred by a friend to pain management. Patient takes Tylenol. Pain is in front of knee. Pain is worse going up and down\par stairs. He feels its stiff. Went to therapy for 2 months.

## 2023-03-17 ENCOUNTER — APPOINTMENT (OUTPATIENT)
Dept: PAIN MANAGEMENT | Facility: CLINIC | Age: 78
End: 2023-03-17
Payer: MEDICARE

## 2023-03-17 VITALS — WEIGHT: 178 LBS | HEIGHT: 68 IN | BODY MASS INDEX: 26.98 KG/M2

## 2023-03-17 PROCEDURE — 99214 OFFICE O/P EST MOD 30 MIN: CPT

## 2023-03-17 NOTE — HISTORY OF PRESENT ILLNESS
[Intermittent] : intermittent [Rest] : rest [Meds] : meds [Injection therapy] : injection therapy [Walking] : walking [8] : 8 [4] : 4 [Sharp] : sharp [Retired] : Work status: retired [] : no [FreeTextEntry1] : b/l knee

## 2023-03-17 NOTE — ASSESSMENT
[FreeTextEntry1] : After discussing various treatment options with the patient including but not limited to oral medications, physical therapy, exercise, modalities as well as interventional spinal injections, we have decided with the following plan:\par \par \par \par Right knee nerve block\par Then rpeat left RFA

## 2023-03-17 NOTE — PHYSICAL EXAM
[NL (0)] : extension 0 degrees [Right] : right knee [AP] : anteroposterior [Lateral] : lateral [advanced tricompartmental OA with lateral compartment narrowing and valgus alignment] : advanced tricompartmental OA with lateral compartment narrowing and valgus alignment [] : no erythema [FreeTextEntry3] : Atrophy due to previous injury [TWNoteComboBox7] : flexion 90 degrees

## 2023-04-07 ENCOUNTER — APPOINTMENT (OUTPATIENT)
Dept: PAIN MANAGEMENT | Facility: CLINIC | Age: 78
End: 2023-04-07
Payer: MEDICARE

## 2023-04-07 PROCEDURE — 64450 NJX AA&/STRD OTHER PN/BRANCH: CPT | Mod: RT

## 2023-04-07 NOTE — PROCEDURE
[FreeTextEntry3] : Date of Service: 04/07/2023 \par \par Account: [id number]\par \par Patient: HAL SINGLETON \par \par YOB: 1945\par \par Age: 78 year\par \par \par Surgeon: Katie Vail M.D.\par \par Pre-Operative Diagnosis: Right Knee osteoarthritis\par \par Post Operative Diagnosis: Right Knee osteoarthritis\par \par Procedure: Right Genicular nerve block under fluoroscopic guidance\par \par Anesthesia: \par \par \par Risks, benefits and alternatives of the procedure were discussed with the patient after which they agreed to proceed. Patient was brought into fluoroscopy suite and was placed in supine position, the Right knee was then placed on pillows as tolerated to offset a clear lateral view. The knee was prepped and draped in a sterile fashion x3. Anesthesia initiated.\par \par Under AP visualization, the superior medial and lateral epicondyle of the femur as well as the distal aspect of the medial tibial epicondyle was identified. Using a 25 gauge ½ inch needle the skin and subcutaneous structures at this point were localized with 1% Lidocaine using approximately 3 cc's 1% Lidocaine. After this, a 25 gauge needle was inserted under constant fluoroscopic visualization. The needles were advanced using fluoroscopic guidance until reaching the mid level of the femur and tibia confirmed with a lateral view. A mixture of 1cc of 0.25% marcaine and depoomedrol was injected at all 3 sites.\par \par Needles were removed and pressure was then applied. Band-aids were applied. Patient was brought to the recovery, ambulated on their own after the procedure.\par \par Patient was told to apply ice to the knee for 20 minutes on and 20 minutes off for focal symptoms for 24-48 hours. Patient is to call the office if they had any questions or concerns.\par \par Katie Vail M.D.\par

## 2023-04-21 ENCOUNTER — APPOINTMENT (OUTPATIENT)
Dept: PAIN MANAGEMENT | Facility: CLINIC | Age: 78
End: 2023-04-21
Payer: MEDICARE

## 2023-04-21 VITALS — HEIGHT: 68 IN | BODY MASS INDEX: 26.98 KG/M2 | WEIGHT: 178 LBS

## 2023-04-21 PROCEDURE — 99214 OFFICE O/P EST MOD 30 MIN: CPT

## 2023-04-21 NOTE — HISTORY OF PRESENT ILLNESS
[Sharp] : sharp [Intermittent] : intermittent [Rest] : rest [Meds] : meds [Injection therapy] : injection therapy [Retired] : Work status: retired [5] : 5 [3] : 3 [Dull/Aching] : dull/aching [Bending forward] : bending forward [] : no [FreeTextEntry1] : b/l knee

## 2023-04-21 NOTE — ASSESSMENT
[FreeTextEntry1] : After discussing various treatment options with the patient including but not limited to oral medications, physical therapy, exercise, modalities as well as interventional spinal injections, we have decided with the following plan:\par \par \par \par B/L genicular RFA\par

## 2023-05-05 ENCOUNTER — APPOINTMENT (OUTPATIENT)
Dept: PAIN MANAGEMENT | Facility: CLINIC | Age: 78
End: 2023-05-05
Payer: MEDICARE

## 2023-05-05 PROCEDURE — 64630 INJECTION TREATMENT OF NERVE: CPT

## 2023-05-05 NOTE — PROCEDURE
[FreeTextEntry3] : Date of Service: 05/05/2023 \par \par Account: [id number]\par \par Patient: HAL SINGLETON \par \par YOB: 1945\par \par Age: 78 year\par \par Surgeon:  Katie Vail M.D.\par \par Pre-Operative Diagnosis:  Bilateral knee pain\par \par Post Operative Diagnosis:  Bilateral knee pain\par \par Procedure:  Left and Right Genicular nerve radiofrequency ablation under fluoroscopic guidance          \par \par Anesthesia:  None\par \par \par Risks, benefits and alternatives of the procedure were discussed with the patient after which they agreed to proceed.  Patient was brought into fluoroscopy suite and was placed in supine position, the knee(s) was then placed on pillows as tolerated to offset a clear lateral view. The knee(s) was prepped and draped in a sterile fashion x3. \par \par Under AP visualization, the superior medial and lateral epicondyle of the femur(s) as well as the distal aspect of the medial tibial epicondyle was identified. Using a 25 gauge ½ inch needle the skin and subcutaneous structures at this point were localized with 1% Lidocaine using approximately 3 cc's 1% Lidocaine.  After this, a 20 gauge 50mm Rosa radiofrequency needle with a 10mm curved tip was inserted under constant fluoroscopic visualization.  The needles were advanced using fluoroscopic guidance until reaching the mid level of the femur(s) and tibia(s) confirmed with a lateral view. \par \par The stylettes were then removed and a Rosa radiofrequency probe was then placed inside the needle. After their pedis' were checked at approximately 300 ohm, 50 hertz sensory stimulation was performed.  Patient experienced concordant pain Stimulation was then changed to 2 hertz, patient did not experience any motor symptoms. This was done at all 6 locations. The 6 levels were then anesthetized with approximately 0.5 cc's of 1% Lidocaine. The Rosa electrodes/cannula were then heated to 60  C for 90 seconds. Needles were removed and pressure was then applied. Band-aids were applied.  Patient was brought to the recovery, ambulated on their own after the procedure.  \par \par Patient was told to apply ice to the knee for 20 minutes on and 20 minutes off for focal symptoms for 24-48 hours. Patient is to call the office if they have any questions or concerns.  \par \par Katie Vail M.D.\par

## 2023-05-22 ENCOUNTER — APPOINTMENT (OUTPATIENT)
Dept: PAIN MANAGEMENT | Facility: CLINIC | Age: 78
End: 2023-05-22
Payer: MEDICARE

## 2023-05-22 VITALS — BODY MASS INDEX: 26.67 KG/M2 | WEIGHT: 176 LBS | HEIGHT: 68 IN

## 2023-05-22 DIAGNOSIS — M25.562 PAIN IN LEFT KNEE: ICD-10-CM

## 2023-05-22 PROCEDURE — 99213 OFFICE O/P EST LOW 20 MIN: CPT

## 2023-05-22 NOTE — HISTORY OF PRESENT ILLNESS
[5] : 5 [3] : 3 [Dull/Aching] : dull/aching [Sharp] : sharp [Intermittent] : intermittent [Rest] : rest [Meds] : meds [Injection therapy] : injection therapy [Bending forward] : bending forward [Retired] : Work status: retired [Stairs] : stairs [] : no [FreeTextEntry1] : b/l knee

## 2023-05-22 NOTE — ASSESSMENT
[FreeTextEntry1] : After discussing various treatment options with the patient including but not limited to oral medications, physical therapy, exercise, modalities as well as interventional spinal injections, we have decided with the following plan:\par \par \par \par B/L genicular RFA- will call\par

## 2023-08-29 ENCOUNTER — NON-APPOINTMENT (OUTPATIENT)
Age: 78
End: 2023-08-29

## 2023-08-31 ENCOUNTER — APPOINTMENT (OUTPATIENT)
Dept: ORTHOPEDIC SURGERY | Facility: CLINIC | Age: 78
End: 2023-08-31

## 2023-09-08 ENCOUNTER — APPOINTMENT (OUTPATIENT)
Dept: ORTHOPEDIC SURGERY | Facility: CLINIC | Age: 78
End: 2023-09-08
Payer: MEDICARE

## 2023-09-08 VITALS — BODY MASS INDEX: 26.67 KG/M2 | WEIGHT: 176 LBS | HEIGHT: 68 IN

## 2023-09-08 PROCEDURE — 99213 OFFICE O/P EST LOW 20 MIN: CPT

## 2023-09-08 PROCEDURE — 73562 X-RAY EXAM OF KNEE 3: CPT | Mod: RT

## 2023-09-08 NOTE — ASSESSMENT
[FreeTextEntry1] : right knee pain and swelling. no fevers or chills. mild to moderate oa.  fluid today.  likely oa flare up. defers aspiration.  history of left tka.

## 2023-09-08 NOTE — PHYSICAL EXAM
[4___] : quadriceps 4[unfilled]/5 [5___] : hamstring 5[unfilled]/5 [] : antalgic [Right] : right knee [Degenerative change] : Degenerative change [TWNoteComboBox7] : flexion 95 degrees [de-identified] : extension 5 degrees

## 2023-09-08 NOTE — HISTORY OF PRESENT ILLNESS
[2] : 2 [Nothing helps with pain getting better] : Nothing helps with pain getting better [Walking] : walking [de-identified] : 9/8/23:  right knee pain and swelling. no fevers or chills. [] : no [FreeTextEntry1] : right knee  [FreeTextEntry6] : bone on bone  [de-identified] : none

## 2023-09-15 ENCOUNTER — APPOINTMENT (OUTPATIENT)
Dept: ORTHOPEDIC SURGERY | Facility: CLINIC | Age: 78
End: 2023-09-15
Payer: MEDICARE

## 2023-09-15 VITALS — HEIGHT: 68 IN | WEIGHT: 176 LBS | BODY MASS INDEX: 26.67 KG/M2

## 2023-09-15 DIAGNOSIS — M25.561 PAIN IN RIGHT KNEE: ICD-10-CM

## 2023-09-15 DIAGNOSIS — M17.11 UNILATERAL PRIMARY OSTEOARTHRITIS, RIGHT KNEE: ICD-10-CM

## 2023-09-15 PROCEDURE — J3490M: CUSTOM | Mod: NC

## 2023-09-15 PROCEDURE — 20610 DRAIN/INJ JOINT/BURSA W/O US: CPT | Mod: RT

## 2023-09-15 PROCEDURE — 99214 OFFICE O/P EST MOD 30 MIN: CPT | Mod: 25

## 2024-06-13 NOTE — ED ADULT NURSE NOTE - ED STAT RN HANDOFF DETAILS
Weekly review of blood sugars:    LAURE: 10/27/2024  Gestational Age: 20w4d  LMP: Patient's last menstrual period was 01/21/2024.    Current Diabetes Medications:  Lifestyle management    New Recommendations:   Lifestyle management    Blood sugars:  Since 6/6                Blood Sugar Assessment:  See highlighted or marked for values outside range above    Pregnancy CGM Target range 63 to 140 mg/dL (based off CONCEPTT trial and International Consensus Guidelines):  ?Time in range: goal >70 percent (>16.8 hours)  ?Time below range : goal <4 percent ( <1 hour)  ?Time below range : <54 mg/dL goal <1 percent ( <0.24 hour)  ?Time above range : >140 mg/dL goal <25 percent (<6 hours)    Dietary/Other Concerns (if any):      Patient Goals:  Monitor blood sugars 4 times daily  Follow CHO goals at meals and snacks  Blood sugar Targets:   less than 95 mg/dL fasting   less than 120 mg/dL 2 hours post meal or  less than 130 mg/dL 1 hour post meal.   Send blood sugars Weekly/BiWeekly     
Report given to Brannon LADD

## 2024-08-15 ENCOUNTER — APPOINTMENT (OUTPATIENT)
Dept: ORTHOPEDIC SURGERY | Facility: CLINIC | Age: 79
End: 2024-08-15

## 2024-08-15 NOTE — DISCUSSION/SUMMARY
[de-identified] :    All options discussed including rest, medicine, home exercise, acupuncture, Chiropractic care, Physical Therapy, Pain management, and last resort surgery. All questions were answered, all alternatives discussed and the patient is in complete agreement with the treatment plan which the patient contributed to and discussed with me through the shared decision making process. Follow-up appointment as instructed. Any issues and the patient will call or come in sooner.

## 2024-08-15 NOTE — PHYSICAL EXAM
[de-identified] : 5 out of 5 motor strength, sensation is intact and symmetrical full range of motion flexion extension and rotation, no palpatory tenderness full range of motion of hips knees shoulders and elbows (all four extremities), no atrophy, negative straight leg raise, no pathological reflexes, no swelling, normal ambulation, no apparent distress skin is intact, no palpable lymph nodes, no upper or lower extremity instability, alert and oriented x3 and normal mood. Normal finger-to nose test.

## 2024-08-15 NOTE — HISTORY OF PRESENT ILLNESS
[de-identified] : 79 year old male presents for evaluation of    No fever chills sweats nausea vomiting no bowel or bladder dysfunction, no recent weight loss or gain no night pain. This history is in addition to the intake form that I personally reviewed.

## 2024-08-19 ENCOUNTER — APPOINTMENT (OUTPATIENT)
Dept: ORTHOPEDIC SURGERY | Facility: CLINIC | Age: 79
End: 2024-08-19
Payer: MEDICARE

## 2024-08-19 VITALS — WEIGHT: 180 LBS | HEIGHT: 67 IN | BODY MASS INDEX: 28.25 KG/M2

## 2024-08-19 DIAGNOSIS — M51.36 OTHER INTERVERTEBRAL DISC DEGENERATION, LUMBAR REGION: ICD-10-CM

## 2024-08-19 DIAGNOSIS — M54.16 RADICULOPATHY, LUMBAR REGION: ICD-10-CM

## 2024-08-19 DIAGNOSIS — M43.16 SPONDYLOLISTHESIS, LUMBAR REGION: ICD-10-CM

## 2024-08-19 PROCEDURE — 99204 OFFICE O/P NEW MOD 45 MIN: CPT

## 2024-08-19 RX ORDER — METHYLPREDNISOLONE 4 MG/1
4 TABLET ORAL
Qty: 1 | Refills: 1 | Status: ACTIVE | COMMUNITY
Start: 2024-08-19 | End: 1900-01-01

## 2024-08-19 NOTE — DISCUSSION/SUMMARY
[de-identified] : Right hip arthritis. Lumbar disc degenerative disease. L4-5 spondylolisthesis. Left lumbar radiculopathy. Discussed all options. MDPx2. Lumbar MRI referral. F/U after MRI. All options discussed including rest, medicine, home exercise, acupuncture, Chiropractic care, Physical Therapy, Pain management, and last resort surgery. All questions were answered, all alternatives discussed and the patient is in complete agreement with the treatment plan which the patient contributed to and discussed with me through the shared decision making process. Follow-up appointment as instructed. Any issues and the patient will call or come in sooner. Wife agrees with plan.

## 2024-08-19 NOTE — ADDENDUM
[FreeTextEntry1] : This note was written by Deo Lorenzana on 08/19/2024 acting as scribe for Dr. Bryce Ordaz M.D.  I, Bryce Ordaz MD, have read and attest that all the information, medical decision making and discharge instructions within are true and accurate.

## 2024-08-19 NOTE — HISTORY OF PRESENT ILLNESS
[Stable] : stable [de-identified] : 79 year old male presents for evaluation of lower back pain with radiation down the LLE since 6/5/24. He states he underwent a right knee replacement in June with Dr. Preston at [facility] and has been having pain since. He states that the pain radiates down the LLE posterolaterally to the calf and then wraps around to the tibial region. Denies numbness/tingling. Laying down aggravates the pain. Pain wakes him from sleep. He was prescribed diclofenac and muscle relaxants by Dr. Preston but no relief. Has not tried PT or chiropractic care. Denies DAVID. PMHx: HTN, HLD  No fever chills sweats nausea vomiting no bowel or bladder dysfunction, no recent weight loss or gain no night pain. This history is in addition to the intake form that I personally reviewed.

## 2024-08-19 NOTE — PHYSICAL EXAM
[Normal] : Gait: normal [Morales's Sign] : negative Morales's sign [Pronator Drift] : negative pronator drift [SLR] : negative straight leg raise [de-identified] : 5 out of 5 motor strength, sensation is intact and symmetrical full range of motion flexion extension and rotation, no palpatory tenderness full range of motion of hips knees shoulders and elbows (all four extremities), no atrophy, negative straight leg raise, no pathological reflexes, no swelling, normal ambulation, no apparent distress skin is intact, no palpable lymph nodes, no upper or lower extremity instability, alert and oriented x3 and normal mood. Normal finger-to nose test. No upper motor neuron findings. Restricted right hip ROM. [de-identified] : XR AP Lat Lumbar 08/19/2024 -right hip arthritis, lumbar disc degenerative disease, L4-5 spondylolisthesis- reviewed with patient.

## 2024-08-26 ENCOUNTER — OUTPATIENT (OUTPATIENT)
Dept: OUTPATIENT SERVICES | Facility: HOSPITAL | Age: 79
LOS: 1 days | End: 2024-08-26
Payer: MEDICARE

## 2024-08-26 ENCOUNTER — APPOINTMENT (OUTPATIENT)
Dept: MRI IMAGING | Facility: CLINIC | Age: 79
End: 2024-08-26
Payer: MEDICARE

## 2024-08-26 DIAGNOSIS — M54.16 RADICULOPATHY, LUMBAR REGION: ICD-10-CM

## 2024-08-26 PROCEDURE — 72148 MRI LUMBAR SPINE W/O DYE: CPT | Mod: 26,MH

## 2024-08-26 PROCEDURE — 72148 MRI LUMBAR SPINE W/O DYE: CPT

## 2024-08-29 ENCOUNTER — NON-APPOINTMENT (OUTPATIENT)
Age: 79
End: 2024-08-29

## 2024-08-29 RX ORDER — TRAMADOL HYDROCHLORIDE 50 MG/1
50 TABLET, COATED ORAL
Qty: 20 | Refills: 0 | Status: ACTIVE | COMMUNITY
Start: 2024-08-29 | End: 1900-01-01

## 2024-08-30 ENCOUNTER — EMERGENCY (EMERGENCY)
Facility: HOSPITAL | Age: 79
LOS: 1 days | Discharge: HOME | End: 2024-08-30
Attending: EMERGENCY MEDICINE
Payer: MEDICARE

## 2024-08-30 VITALS
WEIGHT: 179.9 LBS | OXYGEN SATURATION: 96 % | TEMPERATURE: 98 F | RESPIRATION RATE: 20 BRPM | DIASTOLIC BLOOD PRESSURE: 86 MMHG | HEART RATE: 122 BPM | SYSTOLIC BLOOD PRESSURE: 126 MMHG | HEIGHT: 67 IN

## 2024-08-30 VITALS
HEART RATE: 98 BPM | DIASTOLIC BLOOD PRESSURE: 91 MMHG | OXYGEN SATURATION: 97 % | TEMPERATURE: 98 F | RESPIRATION RATE: 20 BRPM | SYSTOLIC BLOOD PRESSURE: 153 MMHG

## 2024-08-30 PROCEDURE — 99284 EMERGENCY DEPT VISIT MOD MDM: CPT | Mod: GC

## 2024-08-30 PROCEDURE — 96372 THER/PROPH/DIAG INJ SC/IM: CPT

## 2024-08-30 PROCEDURE — 99283 EMERGENCY DEPT VISIT LOW MDM: CPT | Mod: 25

## 2024-08-30 RX ORDER — IBUPROFEN 600 MG
1 TABLET ORAL
Qty: 56 | Refills: 0
Start: 2024-08-30 | End: 2024-09-12

## 2024-08-30 RX ORDER — ACETAMINOPHEN 325 MG/1
975 TABLET ORAL ONCE
Refills: 0 | Status: COMPLETED | OUTPATIENT
Start: 2024-08-30 | End: 2024-08-30

## 2024-08-30 RX ORDER — KETOROLAC TROMETHAMINE 30 MG/ML
30 INJECTION, SOLUTION INTRAMUSCULAR ONCE
Refills: 0 | Status: DISCONTINUED | OUTPATIENT
Start: 2024-08-30 | End: 2024-08-30

## 2024-08-30 RX ORDER — LIDOCAINE/BENZALKONIUM/ALCOHOL
1 SOLUTION, NON-ORAL TOPICAL ONCE
Refills: 0 | Status: COMPLETED | OUTPATIENT
Start: 2024-08-30 | End: 2024-08-30

## 2024-08-30 RX ORDER — GABAPENTIN 100 MG
1 CAPSULE ORAL
Qty: 42 | Refills: 0
Start: 2024-08-30 | End: 2024-09-12

## 2024-08-30 RX ORDER — ACETAMINOPHEN 325 MG/1
2 TABLET ORAL
Qty: 112 | Refills: 0
Start: 2024-08-30 | End: 2024-09-12

## 2024-08-30 RX ADMIN — Medication 1 PATCH: at 15:34

## 2024-08-30 RX ADMIN — ACETAMINOPHEN 975 MILLIGRAM(S): 325 TABLET ORAL at 15:33

## 2024-08-30 RX ADMIN — KETOROLAC TROMETHAMINE 30 MILLIGRAM(S): 30 INJECTION, SOLUTION INTRAMUSCULAR at 15:33

## 2024-08-30 NOTE — ED PROVIDER NOTE - ATTENDING CONTRIBUTION TO CARE
I performed a history and physical exam of the patient and discussed their management with the resident. I reviewed the resident's note and agree with the documented findings and plan of care.  Verito Aragon MD

## 2024-08-30 NOTE — ED ADULT NURSE NOTE - OBJECTIVE STATEMENT
79y Male complaining of back pain general. pt reports 3 weeks atraumatic L lower back pain, diagnosed with herniated disc by spine doctor pt advised to come to ED for pain management d/t minimal improvement with prescribed medications

## 2024-08-30 NOTE — ED PROVIDER NOTE - CLINICAL SUMMARY MEDICAL DECISION MAKING FREE TEXT BOX
79-year-old male PMH hypertension, hyperlipidemia, bladder cancer remote status post surgery no chemo or radiation with neobladder, patient has had left lower back pain radiating down his left extremity since June 5, 2024.  Underwent an right knee replacement in June with Dr. Preston at  [facility] and has been having pain since then.  Denies numbness or tingling.  Pain radiates down the left lower extremity posterolaterally to the calf and wraps around to the tibial region.  Patient saw Dr. Bryce Ordaz on August 19.  Patient had a lumbar MRI spine on August 30, 2024 which shows multilevel moderate to severe spondylosis most evident at the L2-L3, L3-L4, L5-S1 levels.  Also shows moderate to severe left neural foraminal stenosis at L2-L3 and L3-L4.  Suspected disc sequestration migrates inferiorly along the posterior portion of the L4 vertebral body at the left subarticular zone and resultant displacement and impingement of the descending L4 nerve root prior to exiting the L4-L5 neural foramen.  Also shows central disc extrusion migrating superiorly and severe bilateral neuroforaminal stenosis at L4-L5 and moderate right greater than left neuroforaminal stenosis at L5-S1.  Patient states that he was on methocarbamol 2 weeks ago for the pain which did not help and then was placed on prednisone for the pain in the week after which did not help and most recently has been placed on tramadol 75 mg which she took last night and this morning at 6 AM without relief.  Patient states that the pain is 8 out of 10 and is described as sharp.  Patient is able to ambulate without a cane or walker.  Denies fever, chills, chest pain, shortness of breath, numbness, tingling, nausea, vomiting.  Denies saddle anesthesia,  bowel or bladder changes, injections in the back, IV drug use.    Vital Signs Stable  Gen: well appearing, NAD  HEENT: no conjunctivitis  Cardiac: regular rate rhythm, normal S1S2  Chest: CTA BL, no wheeze or crackles  Abdomen: normal BS, soft, NT  Extremity: no gross deformity, good perfusion, -SLR bilaterally, no tenderness to palpation left lower back, no midline spinal tenderness  Skin: no rash  Neuro: grossly normal, normal gait slightly limping 2/2 pain    Will treat pain. No red flag symptoms. Dispo pending clinical course.

## 2024-08-30 NOTE — ED ADULT TRIAGE NOTE - CHIEF COMPLAINT QUOTE
pt reports 3 weeks atraumatic L lower back pain, diagnosed with herniated disc by spine doctor  pt advised to come to ED for pain management d/t minimal improvement with prescribed medications

## 2024-08-30 NOTE — ED PROVIDER NOTE - PROGRESS NOTE DETAILS
Garret Diaz MD PGY-3: pain much better. pt ambulating wants to go home. will dc with ibuprfen, tylenol, gabapentin

## 2024-08-30 NOTE — ED PROVIDER NOTE - NSFOLLOWUPINSTRUCTIONS_ED_ALL_ED_FT
You were evaluated for back pain. Follow up with Dr. Ordaz next week.    You were prescribed tylenol, ibuprofen, and gabapentin. Do not drive or operate heavy machinery when taking the gabapentin as this may cause drowsiness.    You may take 1000 mg Tylenol (acetaminophen) every six hours as needed for pain.  You may take 600mg Ibuprofen (Advil) once every 6 hours as needed for pain. See medication label for warnings and use instructions.  You may use over the counter lidocaine patches called Salonpas, Follow packaging instructions.        Back Pain    Back pain is very common in adults. The cause of back pain is rarely dangerous and the pain often gets better over time. The cause of your back pain may not be known and may include strain of muscles or ligaments, degeneration of the spinal disks, or arthritis. Occasionally the pain may radiate down your leg(s). Over-the-counter medicines to reduce pain and inflammation are often the most helpful. Stretching and remaining active frequently helps the healing process.     SEEK IMMEDIATE MEDICAL CARE IF YOU HAVE ANY OF THE FOLLOWING SYMPTOMS: bowel or bladder control problems, unusual weakness or numbness in your arms or legs, nausea or vomiting, abdominal pain, fever, dizziness/lightheadedness.    Rest, drink plenty of fluids.  Advance activity as tolerated.  Continue all previously prescribed medications as directed.  Follow up with your primary care physician in 48-72 hours- bring copies of your results.  Return to the ER for worsening or persistent symptoms, and/or ANY NEW OR CONCERNING SYMPTOMS. If you have issues obtaining follow up, please call: [phone] to obtain a doctor or specialist who takes your insurance in your area.

## 2024-08-30 NOTE — ED PROVIDER NOTE - PATIENT PORTAL LINK FT
You can access the FollowMyHealth Patient Portal offered by [facility] by registering at the following website: [email or web address]. By joining [facility]’s FollowMyHealth portal, you will also be able to view your health information using other applications (apps) compatible with our system.

## 2024-08-31 RX ORDER — CYCLOBENZAPRINE HYDROCHLORIDE 10 MG/1
10 TABLET, FILM COATED ORAL 3 TIMES DAILY
Qty: 30 | Refills: 0 | Status: ACTIVE | COMMUNITY
Start: 2024-08-31 | End: 1900-01-01

## 2024-09-03 ENCOUNTER — NON-APPOINTMENT (OUTPATIENT)
Age: 79
End: 2024-09-03

## 2024-09-04 ENCOUNTER — APPOINTMENT (OUTPATIENT)
Dept: ORTHOPEDIC SURGERY | Facility: CLINIC | Age: 79
End: 2024-09-04
Payer: MEDICARE

## 2024-09-04 VITALS
HEART RATE: 118 BPM | DIASTOLIC BLOOD PRESSURE: 82 MMHG | WEIGHT: 180 LBS | SYSTOLIC BLOOD PRESSURE: 128 MMHG | HEIGHT: 67 IN | BODY MASS INDEX: 28.25 KG/M2

## 2024-09-04 DIAGNOSIS — M54.16 RADICULOPATHY, LUMBAR REGION: ICD-10-CM

## 2024-09-04 DIAGNOSIS — M51.26 OTHER INTERVERTEBRAL DISC DISPLACEMENT, LUMBAR REGION: ICD-10-CM

## 2024-09-04 PROCEDURE — 99215 OFFICE O/P EST HI 40 MIN: CPT

## 2024-09-04 NOTE — HISTORY OF PRESENT ILLNESS
[Worsening] : worsening [de-identified] : 79 year old male presents for evaluation of lower back pain with radiation down the LLE since 6/5/24. He states he underwent a right knee replacement in June with Dr. Preston at [facility] and has been having pain since. He states that the pain radiates down the LLE posterolaterally to the calf and then wraps around to the tibial region. Denies numbness/tingling. Patient is getting weaker with his left leg and foot.  He is starting to trip.  Has tried medications at home exercise program has been show no symptoms of present neurological exam. Laying down aggravates the pain. Pain wakes him from sleep. He was prescribed diclofenac and muscle relaxants by Dr. Preston but no relief. Has not tried PT or chiropractic care. Denies DAVID. Was prescribed tramadol last week but no relief.  He went to the ED this past weekend as his pain had worsened. He was prescribed medications in the ED which did not help. He had his gabapentin increased to 200 TID which helped. He also took flexeril and tramadol which is also helping.  His pain has improved, however the pain medications wear off quickly and he has temporay relief.  Presents today for MRI Lumbar review.  PMHx: HTN, HLD  No fever chills sweats nausea vomiting no bowel or bladder dysfunction, no recent weight loss or gain no night pain. This history is in addition to the intake form that I personally reviewed.

## 2024-09-04 NOTE — PHYSICAL EXAM
[Normal] : Gait: normal [Morales's Sign] : negative Morales's sign [Pronator Drift] : negative pronator drift [SLR] : negative straight leg raise [de-identified] : Patient has 3+ out of 4 left tibialis anterior and extensor houses longus with decreased patellar reflex on the left he also has decreased sensation in the L4 distribution to his left lower extremity otherwise, 5 out of 5 motor strength, sensation is intact and symmetrical full range of motion flexion extension and rotation, no palpatory tenderness full range of motion of hips knees shoulders and elbows (all four extremities), no atrophy, negative straight leg raise, no pathological reflexes, no swelling, normal ambulation, no apparent distress skin is intact, no palpable lymph nodes, no upper or lower extremity instability, alert and oriented x3 and normal mood. Normal finger-to nose test. No upper motor neuron findings. Restricted right hip ROM. [de-identified] : I reviewed, interpreted and clinically correlated the following outside imaging studies,  MR SPINE LUMBAR  - ORDERED BY: VICENTE BORRERO   PROCEDURE DATE:  08/26/2024    INTERPRETATION:  CLINICAL INFORMATION: Left-sided pain  ADDITIONAL CLINICAL INFORMATION: Not Applicable  TECHNIQUE: Multiplanar, multisequence MRI was performed of the lumbar spine. IV Contrast: NONE  PRIOR STUDIES: Correlation is made with CT of the abdomen and pelvis performed 4/14/2021 and radiograph lumbar spine performed 8/19/2024 ([facility] P[facility]).  FINDINGS:  LOCALIZER: Bilateral hyperintensities within the kidneys, felt to be cysts. BONES: There is no fracture. There is posterior endplate edema at the L4 vertebral body. Modic type III changes at the L2-L3 level. ALIGNMENT: There is dextrocurvature of the lumbar spine. The lumbar lordosis is maintained. There is slight retrolisthesis at L2-L3 and L5-S1. There is grade 1 anterolisthesis at L4-L5. SACROILIAC JOINTS/SACRUM: There is no sacral fracture. The SI joints are partially visualized but are intact. CONUS AND CAUDA EQUINA: The distal cord and conus are normal in signal. Conus terminates at L1. VISUALIZED INTRAPELVIC/INTRA-ABDOMINAL SOFT TISSUES: Normal. PARASPINAL SOFT TISSUES: Mild fatty atrophy of the paraspinal muscles.   INDIVIDUAL LEVELS:  LOWER THORACIC SPINE: There is mild-to-moderate disc height loss, endplate osteophyte formation and a posterior disc bulge. No spinal canal or neural foraminal stenosis  L1-L2: Mild disc height loss, endplate osteophyte formation and posterior disc bulge. No spinal canal stenosis. Mild left greater than right neural foraminal stenosis. Bilateral facet arthropathy. L2-L3: Mild retrolisthesis. Severe disc height loss, endplate osteophyte formation, and posterior disc bulge, asymmetric to the left resulting in flattening of the ventral thecal sac. No spinal canal stenosis. There is moderate to severe left and mild right neural foraminal stenosis. Bilateral facet arthropathy. L3-L4: Moderate disc height loss, endplate osteophyte formation, and posterior disc bulge, asymmetric to the left. Flattening of the ventral thecal sac and mild spinal canal stenosis. There is moderate left and mild right neural foraminal stenosis. Intermediate T2 signal at the posterior L4 vertebral body and within the subarticular zone is thought to represent disc sequestration (series 3, image 9 and series 5, image 33). This results in displacement and impingement on the descending L4 nerve prior to exiting the L4-L5 neural foramen on the left. Bilateral facet arthropathy. L4-L5: Grade 1 anterolisthesis. Mild disc height loss, endplate osteophyte formation, and posterior disc bulge with superimposed protrusions central extrusion migrating superiorly. There is impingement of the thecal sac. No spinal canal stenosis. There is severe bilateral neural foraminal stenosis. Bilateral facet arthropathy. L5-S1: Mild retrolisthesis. Severe disc height loss, endplate osteophyte formation, and posterior disc bulge, asymmetric to the right. There is no spinal canal stenosis. There is moderate right greater than left neural foraminal stenosis. Bilateral facet arthropathy.   IMPRESSION:  MRI of the lumbar spine demonstrates multilevel moderate to severe spondylosis, most evident at the L2-L3, L3-L4, L5-S1 levels.  At L2-L3, there is moderate to severe left neural foraminal stenosis.  At L3-L4, there is moderate left neural foraminal stenosis. Suspected disc sequestration migrates inferiorly along posterior portion of the L4 vertebral body at the left subarticular zone and resultant displacement and impingement of the descending L4 nerve root prior to exiting the L4-L5 neural foramen.  L4-L5, there is central disc extrusion migrating superiorly. Severe bilateral neural foraminal stenosis.  At L5-S1, there is moderate right greater than left neural foraminal stenosis.  --- End of Report ---      XR AP Lat Lumbar 08/19/2024 -right hip arthritis, lumbar disc degenerative disease, L4-5 spondylolisthesis- reviewed with patient.

## 2024-09-04 NOTE — ADDENDUM
[FreeTextEntry1] : This note was written by Deo Lorenzana on 09/04/2024 acting as scribe for Dr. Bryce Ordaz M.D.  I, Bryce Ordaz MD, have read and attest that all the information, medical decision making and discharge instructions within are true and accurate.

## 2024-09-04 NOTE — DISCUSSION/SUMMARY
[Medication Risks Reviewed] : Medication risks reviewed [Surgical risks reviewed] : Surgical risks reviewed [de-identified] : Right hip arthritis. Lumbar disc degenerative disease. L4-5 spondylolisthesis. It is showing large left-sided herniation of with severe compression of the L3 and L4 nerve root on the left. Patient is presenting with progressive neurologic deficit MRI worse.   Discussed all options. Patient is miserable and worsening requires surgical intervention for progressive neurologic deficit. MDPx2. Discussed left L3-4 laminectomy. Risks of surgery include infection, dural tear, nerve root injury, reherniation, future leg pain, future back pain, retained fragment, hematoma, urinary retention, worsening leg symptoms, foot drop, anesthetic risks, blood transfusion risks, positioning pain, visceral vascular injury, deep vein thrombosis, pulmonary embolus, and death. All risks were explained not exclusive to the ones mentioned alternatives were discussed and all questions were answered the patient agrees and understands the above and is in complete agreement with the plan. All options discussed including rest, medicine, home exercise, acupuncture, Chiropractic care, Physical Therapy, Pain management, and last resort surgery. All questions were answered, all alternatives discussed and the patient is in complete agreement with the treatment plan which the patient contributed to and discussed with me through the shared decision making process. Follow-up appointment as instructed. Any issues and the patient will call or come in sooner. Wife agrees with plan.

## 2024-09-06 PROBLEM — I10 ESSENTIAL (PRIMARY) HYPERTENSION: Chronic | Status: ACTIVE | Noted: 2024-08-30

## 2024-09-06 PROBLEM — C67.9 MALIGNANT NEOPLASM OF BLADDER, UNSPECIFIED: Chronic | Status: ACTIVE | Noted: 2024-08-30

## 2024-09-06 PROBLEM — E78.5 HYPERLIPIDEMIA, UNSPECIFIED: Chronic | Status: ACTIVE | Noted: 2024-08-30

## 2024-09-09 ENCOUNTER — NON-APPOINTMENT (OUTPATIENT)
Age: 79
End: 2024-09-09

## 2024-09-16 ENCOUNTER — OUTPATIENT (OUTPATIENT)
Dept: OUTPATIENT SERVICES | Facility: HOSPITAL | Age: 79
LOS: 1 days | End: 2024-09-16

## 2024-09-16 VITALS
DIASTOLIC BLOOD PRESSURE: 80 MMHG | RESPIRATION RATE: 16 BRPM | WEIGHT: 192.02 LBS | TEMPERATURE: 97 F | SYSTOLIC BLOOD PRESSURE: 146 MMHG | HEART RATE: 88 BPM | HEIGHT: 66.5 IN | OXYGEN SATURATION: 98 %

## 2024-09-16 DIAGNOSIS — Z96.653 PRESENCE OF ARTIFICIAL KNEE JOINT, BILATERAL: Chronic | ICD-10-CM

## 2024-09-16 DIAGNOSIS — M51.26 OTHER INTERVERTEBRAL DISC DISPLACEMENT, LUMBAR REGION: ICD-10-CM

## 2024-09-16 DIAGNOSIS — I10 ESSENTIAL (PRIMARY) HYPERTENSION: ICD-10-CM

## 2024-09-16 DIAGNOSIS — M54.16 RADICULOPATHY, LUMBAR REGION: ICD-10-CM

## 2024-09-16 DIAGNOSIS — C67.9 MALIGNANT NEOPLASM OF BLADDER, UNSPECIFIED: Chronic | ICD-10-CM

## 2024-09-16 LAB
A1C WITH ESTIMATED AVERAGE GLUCOSE RESULT: 5.4 % — SIGNIFICANT CHANGE UP (ref 4–5.6)
ANION GAP SERPL CALC-SCNC: 12 MMOL/L — SIGNIFICANT CHANGE UP (ref 7–14)
BLD GP AB SCN SERPL QL: NEGATIVE — SIGNIFICANT CHANGE UP
BUN SERPL-MCNC: 19 MG/DL — SIGNIFICANT CHANGE UP (ref 7–23)
CALCIUM SERPL-MCNC: 8.9 MG/DL — SIGNIFICANT CHANGE UP (ref 8.4–10.5)
CHLORIDE SERPL-SCNC: 105 MMOL/L — SIGNIFICANT CHANGE UP (ref 98–107)
CO2 SERPL-SCNC: 24 MMOL/L — SIGNIFICANT CHANGE UP (ref 22–31)
CREAT SERPL-MCNC: 0.83 MG/DL — SIGNIFICANT CHANGE UP (ref 0.5–1.3)
EGFR: 88 ML/MIN/1.73M2 — SIGNIFICANT CHANGE UP
ESTIMATED AVERAGE GLUCOSE: 108 — SIGNIFICANT CHANGE UP
GLUCOSE SERPL-MCNC: 111 MG/DL — HIGH (ref 70–99)
HCT VFR BLD CALC: 33.3 % — LOW (ref 39–50)
HGB BLD-MCNC: 10.4 G/DL — LOW (ref 13–17)
MCHC RBC-ENTMCNC: 19 PG — LOW (ref 27–34)
MCHC RBC-ENTMCNC: 31.2 GM/DL — LOW (ref 32–36)
MCV RBC AUTO: 61 FL — LOW (ref 80–100)
PLATELET # BLD AUTO: 223 K/UL — SIGNIFICANT CHANGE UP (ref 150–400)
POTASSIUM SERPL-MCNC: 3.6 MMOL/L — SIGNIFICANT CHANGE UP (ref 3.5–5.3)
POTASSIUM SERPL-SCNC: 3.6 MMOL/L — SIGNIFICANT CHANGE UP (ref 3.5–5.3)
RBC # BLD: 5.46 M/UL — SIGNIFICANT CHANGE UP (ref 4.2–5.8)
RBC # FLD: 18.4 % — HIGH (ref 10.3–14.5)
RH IG SCN BLD-IMP: POSITIVE — SIGNIFICANT CHANGE UP
RH IG SCN BLD-IMP: POSITIVE — SIGNIFICANT CHANGE UP
SODIUM SERPL-SCNC: 141 MMOL/L — SIGNIFICANT CHANGE UP (ref 135–145)
WBC # BLD: 7.54 K/UL — SIGNIFICANT CHANGE UP (ref 3.8–10.5)
WBC # FLD AUTO: 7.54 K/UL — SIGNIFICANT CHANGE UP (ref 3.8–10.5)

## 2024-09-16 RX ORDER — CHLORHEXIDINE GLUCONATE ORAL RINSE 1.2 MG/ML
1 SOLUTION DENTAL DAILY
Refills: 0 | Status: DISCONTINUED | OUTPATIENT
Start: 2024-09-24 | End: 2024-09-25

## 2024-09-16 RX ORDER — SODIUM CHLORIDE 0.9 % (FLUSH) 0.9 %
3 SYRINGE (ML) INJECTION EVERY 8 HOURS
Refills: 0 | Status: DISCONTINUED | OUTPATIENT
Start: 2024-09-24 | End: 2024-09-25

## 2024-09-16 NOTE — H&P PST ADULT - NSICDXPASTMEDICALHX_GEN_ALL_CORE_FT
PAST MEDICAL HISTORY:  Bladder cancer     HLD (hyperlipidemia)     HTN (hypertension)     Radiculopathy, lumbar region      PAST MEDICAL HISTORY:  Bladder cancer     GERD (gastroesophageal reflux disease)     HLD (hyperlipidemia)     HTN (hypertension)     Radiculopathy, lumbar region

## 2024-09-16 NOTE — H&P PST ADULT - PROBLEM SELECTOR PLAN 3
Pre-op Delirium Screening Questionnaire:    Patient eligible for [employer] risk screen age>75?  (if <= 75 then done) YES    Health care proxy paperwork given to patient? Yes (all patients should be given the packet to fill out at home and return on day of surgery to pre-op RN)    Impaired mobility (ie: uses cane, walker, wheelchair, or assist device)? NO    Known dementia diagnosis? NO    Impaired functional status (METS<4)? NO    Malnutrition BMI<20? NO

## 2024-09-16 NOTE — H&P PST ADULT - PROBLEM SELECTOR PLAN 1
Scheduled for L3-L4 lumbar laminectomy  Written & verbal preop instructions, gi prophylaxis & surgical soap given  Pt verbalized good understanding.  Teach back done on surgical soap instructions. Scheduled for L3-L4 lumbar laminectomy  Written & verbal preop instructions, gi prophylaxis & surgical soap given  Pt verbalized good understanding.  Teach back done on surgical soap instructions.  Medical eval done per surgeon request - pending copy of report, recent echo & EKG

## 2024-09-16 NOTE — H&P PST ADULT - HISTORY OF PRESENT ILLNESS
79 year old male presents for  preop evaluation for scheduled L3-L4 lumbar laminectomy.  Pt report having  of lower back pain with radiation down the LLE since June 2024. Pt states was seen in [facility]  for worsening pain.  Imaging done.  Preop dx radiculopathy lumbar region & other intervertebral disc displacement lumbar region.  Pt with h/o arthritis h/o b/l knee arthroplasty.   79 year old male presents for  preop evaluation for scheduled L3-L4 lumbar laminectomy.  Pt report having  of lower back pain with radiation down the LLE since June 2024. Pt states recent [facility] ER visit   for worsening lower back pain.  Imaging done.  Preop dx radiculopathy lumbar region & other intervertebral disc displacement lumbar region.  Pt with h/o arthritis h/o b/l knee arthroplasty.

## 2024-09-16 NOTE — H&P PST ADULT - NSICDXPASTSURGICALHX_GEN_ALL_CORE_FT
PAST SURGICAL HISTORY:  History of bilateral knee arthroplasty     Malignant tumor of urinary bladder

## 2024-09-17 LAB
BASOPHILS # BLD AUTO: 0.07 K/UL — SIGNIFICANT CHANGE UP (ref 0–0.2)
BASOPHILS NFR BLD AUTO: 0.9 % — SIGNIFICANT CHANGE UP (ref 0–2)
EOSINOPHIL # BLD AUTO: 0 K/UL — SIGNIFICANT CHANGE UP (ref 0–0.5)
EOSINOPHIL NFR BLD AUTO: 0 % — SIGNIFICANT CHANGE UP (ref 0–6)
LYMPHOCYTES # BLD AUTO: 0.2 K/UL — LOW (ref 1–3.3)
LYMPHOCYTES # BLD AUTO: 2.6 % — LOW (ref 13–44)
MONOCYTES # BLD AUTO: 0.46 K/UL — SIGNIFICANT CHANGE UP (ref 0–0.9)
MONOCYTES NFR BLD AUTO: 6.1 % — SIGNIFICANT CHANGE UP (ref 2–14)
MRSA PCR RESULT.: SIGNIFICANT CHANGE UP
NEUTROPHILS # BLD AUTO: 6.82 K/UL — SIGNIFICANT CHANGE UP (ref 1.8–7.4)
NEUTROPHILS NFR BLD AUTO: 90.4 % — HIGH (ref 43–77)
S AUREUS DNA NOSE QL NAA+PROBE: SIGNIFICANT CHANGE UP

## 2024-09-18 RX ORDER — IRON DEXTRAN 50 MG/ML
975 INJECTION, SOLUTION INTRAVENOUS
Qty: 20 | Refills: 0
Start: 2024-09-18

## 2024-09-18 RX ORDER — IRON DEXTRAN 50 MG/ML
25 INJECTION, SOLUTION INTRAVENOUS
Qty: 2 | Refills: 0
Start: 2024-09-18

## 2024-09-18 RX ORDER — FERRIC DERISOMALTOSE 1000 MG/10ML
1000 SOLUTION INTRAVENOUS
Qty: 1 | Refills: 0
Start: 2024-09-18

## 2024-09-18 NOTE — PROGRESS NOTE ADULT - SUBJECTIVE AND OBJECTIVE BOX
Provider Specialty: Anesthesiology    Reason for referral/consultation:  Anemia    Subjective/Objective:    This is a  79y y.o patient, who is scheduled for a L3-4 Laminectomy with Dr. Ordaz on 9/24/24.      During the preoperative evaluation, the patient was diagnosed with iron deficiency anemia.  The patient was contacted by phone.  The diagnosis and treatment were explained in detail, including risks and benefits.  Questions were encouraged and answered.  The patient is agreeable with the plan.    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  Preoperative anemia has been shown to be an independent risk factor for perioperative morbidity and mortality. This risk is further exacerbated by surgical blood loss and is not mitigated by allogeneic transfusion and the presence of anemia should be evaluated in all surgical patients, especially in those where moderate blood loss is anticipated.    Recommendation:      IV iron 1000 mg x 1 dose.  IV iron is safe and efficacious and should be used as front-line therapy in patients in whom surgery is planned for less than 6 weeks after the diagnosis of anemia, who do not respond to oral iron or are not able to tolerate it.  Suggest repeat lab work 2-4 weeks after iron infusion.

## 2024-09-23 ENCOUNTER — TRANSCRIPTION ENCOUNTER (OUTPATIENT)
Age: 79
End: 2024-09-23

## 2024-09-23 NOTE — ASU PATIENT PROFILE, ADULT - TEACHING/LEARNING DEVELOPMENTAL CONSIDERATIONS
21      Mayra Pearson  [address]  [place] [address]           To Whom it May Concern,       Re: Mayra Pearson (: 1949)     The following medications are recommended for Mayra to take for her current medical conditions:        Potassium Chloride 10 mEq once daily.   Omega 3 Fish Oil 1,200 mg two times daily.            Sincerely,     (Electronically Signed By):  ROBEL Corrales  [address] [place] [address]  Phone: [phone]  Fax: [phone]               none

## 2024-09-23 NOTE — ASU PATIENT PROFILE, ADULT - NSICDXPASTMEDICALHX_GEN_ALL_CORE_FT
PAST MEDICAL HISTORY:  Bladder cancer     GERD (gastroesophageal reflux disease)     HLD (hyperlipidemia)     HTN (hypertension)     Radiculopathy, lumbar region

## 2024-09-23 NOTE — ASU PATIENT PROFILE, ADULT - FALL HARM RISK - UNIVERSAL INTERVENTIONS
Bed in lowest position, wheels locked, appropriate side rails in place/Call bell, personal items and telephone in reach/Instruct patient to call for assistance before getting out of bed or chair/Non-slip footwear when patient is out of bed/[place] to call system/Physically safe environment - no spills, clutter or unnecessary equipment/Purposeful Proactive Rounding/Room/bathroom lighting operational, light cord in reach

## 2024-09-24 ENCOUNTER — APPOINTMENT (OUTPATIENT)
Dept: ORTHOPEDIC SURGERY | Facility: HOSPITAL | Age: 79
End: 2024-09-24

## 2024-09-24 ENCOUNTER — RESULT REVIEW (OUTPATIENT)
Age: 79
End: 2024-09-24

## 2024-09-24 ENCOUNTER — TRANSCRIPTION ENCOUNTER (OUTPATIENT)
Age: 79
End: 2024-09-24

## 2024-09-24 ENCOUNTER — INPATIENT (INPATIENT)
Facility: HOSPITAL | Age: 79
LOS: 0 days | Discharge: HOME | End: 2024-09-25
Attending: ORTHOPAEDIC SURGERY | Admitting: ORTHOPAEDIC SURGERY
Payer: MEDICARE

## 2024-09-24 VITALS
WEIGHT: 192.02 LBS | DIASTOLIC BLOOD PRESSURE: 89 MMHG | SYSTOLIC BLOOD PRESSURE: 153 MMHG | HEIGHT: 66.5 IN | HEART RATE: 97 BPM | RESPIRATION RATE: 18 BRPM | OXYGEN SATURATION: 98 % | TEMPERATURE: 98 F

## 2024-09-24 DIAGNOSIS — M51.26 OTHER INTERVERTEBRAL DISC DISPLACEMENT, LUMBAR REGION: ICD-10-CM

## 2024-09-24 DIAGNOSIS — C67.9 MALIGNANT NEOPLASM OF BLADDER, UNSPECIFIED: Chronic | ICD-10-CM

## 2024-09-24 DIAGNOSIS — Z96.653 PRESENCE OF ARTIFICIAL KNEE JOINT, BILATERAL: Chronic | ICD-10-CM

## 2024-09-24 LAB
ANION GAP SERPL CALC-SCNC: 9 MMOL/L — SIGNIFICANT CHANGE UP (ref 7–14)
BASOPHILS # BLD AUTO: 0.02 K/UL — SIGNIFICANT CHANGE UP (ref 0–0.2)
BASOPHILS NFR BLD AUTO: 0.3 % — SIGNIFICANT CHANGE UP (ref 0–2)
BUN SERPL-MCNC: 18 MG/DL — SIGNIFICANT CHANGE UP (ref 7–23)
CALCIUM SERPL-MCNC: 8.6 MG/DL — SIGNIFICANT CHANGE UP (ref 8.4–10.5)
CHLORIDE SERPL-SCNC: 107 MMOL/L — SIGNIFICANT CHANGE UP (ref 98–107)
CO2 SERPL-SCNC: 25 MMOL/L — SIGNIFICANT CHANGE UP (ref 22–31)
CREAT SERPL-MCNC: 0.93 MG/DL — SIGNIFICANT CHANGE UP (ref 0.5–1.3)
EGFR: 83 ML/MIN/1.73M2 — SIGNIFICANT CHANGE UP
EOSINOPHIL # BLD AUTO: 0.02 K/UL — SIGNIFICANT CHANGE UP (ref 0–0.5)
EOSINOPHIL NFR BLD AUTO: 0.3 % — SIGNIFICANT CHANGE UP (ref 0–6)
GLUCOSE BLDC GLUCOMTR-MCNC: 113 MG/DL — HIGH (ref 70–99)
GLUCOSE SERPL-MCNC: 115 MG/DL — HIGH (ref 70–99)
HCT VFR BLD CALC: 31.8 % — LOW (ref 39–50)
HGB BLD-MCNC: 10.1 G/DL — LOW (ref 13–17)
IANC: 5.97 K/UL — SIGNIFICANT CHANGE UP (ref 1.8–7.4)
IMM GRANULOCYTES NFR BLD AUTO: 1 % — HIGH (ref 0–0.9)
LYMPHOCYTES # BLD AUTO: 0.52 K/UL — LOW (ref 1–3.3)
LYMPHOCYTES # BLD AUTO: 7.6 % — LOW (ref 13–44)
MCHC RBC-ENTMCNC: 18.9 PG — LOW (ref 27–34)
MCHC RBC-ENTMCNC: 31.8 GM/DL — LOW (ref 32–36)
MCV RBC AUTO: 59.6 FL — LOW (ref 80–100)
MONOCYTES # BLD AUTO: 0.27 K/UL — SIGNIFICANT CHANGE UP (ref 0–0.9)
MONOCYTES NFR BLD AUTO: 3.9 % — SIGNIFICANT CHANGE UP (ref 2–14)
NEUTROPHILS # BLD AUTO: 5.97 K/UL — SIGNIFICANT CHANGE UP (ref 1.8–7.4)
NEUTROPHILS NFR BLD AUTO: 86.9 % — HIGH (ref 43–77)
NRBC # BLD: 0 /100 WBCS — SIGNIFICANT CHANGE UP (ref 0–0)
NRBC # FLD: 0 K/UL — SIGNIFICANT CHANGE UP (ref 0–0)
PLATELET # BLD AUTO: 240 K/UL — SIGNIFICANT CHANGE UP (ref 150–400)
POTASSIUM SERPL-MCNC: 3.9 MMOL/L — SIGNIFICANT CHANGE UP (ref 3.5–5.3)
POTASSIUM SERPL-SCNC: 3.9 MMOL/L — SIGNIFICANT CHANGE UP (ref 3.5–5.3)
RBC # BLD: 5.34 M/UL — SIGNIFICANT CHANGE UP (ref 4.2–5.8)
RBC # FLD: 17.1 % — HIGH (ref 10.3–14.5)
SODIUM SERPL-SCNC: 141 MMOL/L — SIGNIFICANT CHANGE UP (ref 135–145)
WBC # BLD: 6.87 K/UL — SIGNIFICANT CHANGE UP (ref 3.8–10.5)
WBC # FLD AUTO: 6.87 K/UL — SIGNIFICANT CHANGE UP (ref 3.8–10.5)

## 2024-09-24 PROCEDURE — 72100 X-RAY EXAM L-S SPINE 2/3 VWS: CPT | Mod: 26

## 2024-09-24 PROCEDURE — 22612 ARTHRD PST TQ 1NTRSPC LUMBAR: CPT

## 2024-09-24 PROCEDURE — 63030 LAMOT DCMPRN NRV RT 1 LMBR: CPT | Mod: 82,59,LT

## 2024-09-24 PROCEDURE — 63047 LAM FACETEC & FORAMOT LUMBAR: CPT

## 2024-09-24 PROCEDURE — 63047 LAM FACETEC & FORAMOT LUMBAR: CPT | Mod: 82

## 2024-09-24 PROCEDURE — 22612 ARTHRD PST TQ 1NTRSPC LUMBAR: CPT | Mod: 82

## 2024-09-24 PROCEDURE — 63030 LAMOT DCMPRN NRV RT 1 LMBR: CPT | Mod: 59,LT

## 2024-09-24 PROCEDURE — 99222 1ST HOSP IP/OBS MODERATE 55: CPT

## 2024-09-24 PROCEDURE — 88304 TISSUE EXAM BY PATHOLOGIST: CPT | Mod: 26

## 2024-09-24 DEVICE — BONE WAX 2.5GM: Type: IMPLANTABLE DEVICE | Status: FUNCTIONAL

## 2024-09-24 DEVICE — SURGIFOAM PAD 8CM X 12.5CM X 2MM (100C): Type: IMPLANTABLE DEVICE | Status: FUNCTIONAL

## 2024-09-24 DEVICE — SURGIFLO MATRIX WITH THROMBIN KIT: Type: IMPLANTABLE DEVICE | Status: FUNCTIONAL

## 2024-09-24 RX ORDER — SODIUM CHLORIDE 0.9 % (FLUSH) 0.9 %
500 SYRINGE (ML) INJECTION ONCE
Refills: 0 | Status: COMPLETED | OUTPATIENT
Start: 2024-09-25 | End: 2024-09-25

## 2024-09-24 RX ORDER — TRAMADOL HYDROCHLORIDE 50 MG/1
25 TABLET, COATED ORAL ONCE
Refills: 0 | Status: DISCONTINUED | OUTPATIENT
Start: 2024-09-24 | End: 2024-09-24

## 2024-09-24 RX ORDER — ONDANSETRON HCL/PF 4 MG/2 ML
4 VIAL (ML) INJECTION EVERY 6 HOURS
Refills: 0 | Status: DISCONTINUED | OUTPATIENT
Start: 2024-09-24 | End: 2024-09-25

## 2024-09-24 RX ORDER — GABAPENTIN 800 MG/1
100 TABLET, FILM COATED ORAL THREE TIMES A DAY
Refills: 0 | Status: DISCONTINUED | OUTPATIENT
Start: 2024-09-24 | End: 2024-09-25

## 2024-09-24 RX ORDER — OXYCODONE HYDROCHLORIDE 30 MG/1
5 TABLET, FILM COATED, EXTENDED RELEASE ORAL EVERY 4 HOURS
Refills: 0 | Status: DISCONTINUED | OUTPATIENT
Start: 2024-09-24 | End: 2024-09-25

## 2024-09-24 RX ORDER — SODIUM CHLORIDE 0.9 % (FLUSH) 0.9 %
500 SYRINGE (ML) INJECTION ONCE
Refills: 0 | Status: COMPLETED | OUTPATIENT
Start: 2024-09-24 | End: 2024-09-24

## 2024-09-24 RX ORDER — CYCLOBENZAPRINE HCL 10 MG
5 TABLET ORAL EVERY 8 HOURS
Refills: 0 | Status: DISCONTINUED | OUTPATIENT
Start: 2024-09-24 | End: 2024-09-25

## 2024-09-24 RX ORDER — SODIUM CHLORIDE 0.9 % (FLUSH) 0.9 %
1000 SYRINGE (ML) INJECTION
Refills: 0 | Status: DISCONTINUED | OUTPATIENT
Start: 2024-09-24 | End: 2024-09-25

## 2024-09-24 RX ORDER — TRAMADOL HYDROCHLORIDE 50 MG/1
50 TABLET, COATED ORAL EVERY 6 HOURS
Refills: 0 | Status: DISCONTINUED | OUTPATIENT
Start: 2024-09-24 | End: 2024-09-25

## 2024-09-24 RX ORDER — PANTOPRAZOLE SODIUM 40 MG/1
40 TABLET, DELAYED RELEASE ORAL ONCE
Refills: 0 | Status: COMPLETED | OUTPATIENT
Start: 2024-09-24 | End: 2024-09-24

## 2024-09-24 RX ORDER — SENNOSIDES 8.6 MG
2 TABLET ORAL AT BEDTIME
Refills: 0 | Status: DISCONTINUED | OUTPATIENT
Start: 2024-09-24 | End: 2024-09-25

## 2024-09-24 RX ORDER — MAGNESIUM HYDROXIDE 400 MG/5ML
30 SUSPENSION, ORAL (FINAL DOSE FORM) ORAL EVERY 12 HOURS
Refills: 0 | Status: DISCONTINUED | OUTPATIENT
Start: 2024-09-24 | End: 2024-09-25

## 2024-09-24 RX ORDER — HYDROMORPHONE HYDROCHLORIDE 1 MG/ML
0.5 INJECTION, SOLUTION INTRAMUSCULAR; INTRAVENOUS; SUBCUTANEOUS
Refills: 0 | Status: DISCONTINUED | OUTPATIENT
Start: 2024-09-24 | End: 2024-09-24

## 2024-09-24 RX ORDER — ONDANSETRON HCL/PF 4 MG/2 ML
4 VIAL (ML) INJECTION ONCE
Refills: 0 | Status: DISCONTINUED | OUTPATIENT
Start: 2024-09-24 | End: 2024-09-24

## 2024-09-24 RX ORDER — PREGABALIN 25 MG/1
75 CAPSULE ORAL ONCE
Refills: 0 | Status: DISCONTINUED | OUTPATIENT
Start: 2024-09-24 | End: 2024-09-24

## 2024-09-24 RX ORDER — OXYCODONE HYDROCHLORIDE 30 MG/1
2.5 TABLET, FILM COATED, EXTENDED RELEASE ORAL EVERY 6 HOURS
Refills: 0 | Status: DISCONTINUED | OUTPATIENT
Start: 2024-09-24 | End: 2024-09-25

## 2024-09-24 RX ORDER — ACETAMINOPHEN 325 MG
975 TABLET ORAL ONCE
Refills: 0 | Status: COMPLETED | OUTPATIENT
Start: 2024-09-24 | End: 2024-09-24

## 2024-09-24 RX ORDER — ACETAMINOPHEN 325 MG
975 TABLET ORAL EVERY 8 HOURS
Refills: 0 | Status: DISCONTINUED | OUTPATIENT
Start: 2024-09-24 | End: 2024-09-25

## 2024-09-24 RX ORDER — ATORVASTATIN CALCIUM 10 MG/1
10 TABLET, FILM COATED ORAL AT BEDTIME
Refills: 0 | Status: DISCONTINUED | OUTPATIENT
Start: 2024-09-24 | End: 2024-09-25

## 2024-09-24 RX ORDER — INFLUENZA VIRUS VACCINE 15; 15; 15; 15 UG/.5ML; UG/.5ML; UG/.5ML; UG/.5ML
0.5 SUSPENSION INTRAMUSCULAR ONCE
Refills: 0 | Status: COMPLETED | OUTPATIENT
Start: 2024-09-24 | End: 2024-09-24

## 2024-09-24 RX ORDER — PANTOPRAZOLE SODIUM 40 MG/1
40 TABLET, DELAYED RELEASE ORAL
Refills: 0 | Status: DISCONTINUED | OUTPATIENT
Start: 2024-09-24 | End: 2024-09-25

## 2024-09-24 RX ORDER — LOSARTAN POTASSIUM 100 MG/1
100 TABLET, FILM COATED ORAL DAILY
Refills: 0 | Status: DISCONTINUED | OUTPATIENT
Start: 2024-09-24 | End: 2024-09-25

## 2024-09-24 RX ORDER — OXYCODONE HYDROCHLORIDE 30 MG/1
5 TABLET, FILM COATED, EXTENDED RELEASE ORAL ONCE
Refills: 0 | Status: DISCONTINUED | OUTPATIENT
Start: 2024-09-24 | End: 2024-09-24

## 2024-09-24 RX ORDER — SODIUM CHLORIDE IRRIG SOLUTION 0.9 %
1000 SOLUTION, IRRIGATION IRRIGATION
Refills: 0 | Status: DISCONTINUED | OUTPATIENT
Start: 2024-09-24 | End: 2024-09-24

## 2024-09-24 RX ORDER — CEFAZOLIN SODIUM 1 G
2000 VIAL (EA) INJECTION EVERY 8 HOURS
Refills: 0 | Status: COMPLETED | OUTPATIENT
Start: 2024-09-24 | End: 2024-09-25

## 2024-09-24 RX ADMIN — Medication 100 MILLIGRAM(S): at 16:13

## 2024-09-24 RX ADMIN — GABAPENTIN 100 MILLIGRAM(S): 800 TABLET, FILM COATED ORAL at 21:48

## 2024-09-24 RX ADMIN — Medication 975 MILLIGRAM(S): at 07:20

## 2024-09-24 RX ADMIN — Medication 3 MILLILITER(S): at 21:04

## 2024-09-24 RX ADMIN — Medication 975 MILLIGRAM(S): at 15:50

## 2024-09-24 RX ADMIN — GABAPENTIN 100 MILLIGRAM(S): 800 TABLET, FILM COATED ORAL at 14:05

## 2024-09-24 RX ADMIN — Medication 5 MILLIGRAM(S): at 21:48

## 2024-09-24 RX ADMIN — Medication 2 TABLET(S): at 21:49

## 2024-09-24 RX ADMIN — Medication 100 MILLILITER(S): at 11:41

## 2024-09-24 RX ADMIN — ATORVASTATIN CALCIUM 10 MILLIGRAM(S): 10 TABLET, FILM COATED ORAL at 21:48

## 2024-09-24 RX ADMIN — CHLORHEXIDINE GLUCONATE ORAL RINSE 1 APPLICATION(S): 1.2 SOLUTION DENTAL at 06:42

## 2024-09-24 RX ADMIN — TRAMADOL HYDROCHLORIDE 50 MILLIGRAM(S): 50 TABLET, COATED ORAL at 13:18

## 2024-09-24 RX ADMIN — Medication 1000 MILLILITER(S): at 14:46

## 2024-09-24 RX ADMIN — TRAMADOL HYDROCHLORIDE 25 MILLIGRAM(S): 50 TABLET, COATED ORAL at 07:19

## 2024-09-24 RX ADMIN — Medication 5 MILLIGRAM(S): at 14:05

## 2024-09-24 RX ADMIN — TRAMADOL HYDROCHLORIDE 50 MILLIGRAM(S): 50 TABLET, COATED ORAL at 14:00

## 2024-09-24 RX ADMIN — Medication 3 MILLILITER(S): at 14:11

## 2024-09-24 RX ADMIN — TRAMADOL HYDROCHLORIDE 50 MILLIGRAM(S): 50 TABLET, COATED ORAL at 18:57

## 2024-09-24 RX ADMIN — Medication 975 MILLIGRAM(S): at 23:38

## 2024-09-24 RX ADMIN — PREGABALIN 75 MILLIGRAM(S): 25 CAPSULE ORAL at 07:20

## 2024-09-24 RX ADMIN — Medication 975 MILLIGRAM(S): at 14:50

## 2024-09-24 NOTE — DISCHARGE NOTE PROVIDER - NSDCCPTREATMENT_GEN_ALL_CORE_FT
PRINCIPAL PROCEDURE  Procedure: Lumbar laminectomy with discectomy by posterior approach  Findings and Treatment: Pain control:        -Acetaminophen 500mg - 2 tabs every 8 hours  As needed:        -Flexeril 5mg - 1 tab three times a day - Take as needed for musle spasms        -Gabapentin 100mg - 1 tab every 8 hours        -Tramadol 50mg - 1 tab every 6 hours - Take only if needed for MODERATE pain       -Oxycodone 5mg - 1 tab every 4-6 hours - Take only if needed for SEVERE or BREAKTHROUGH pain  Oxycodone and Tramadol have been sent to your pharmacy. Please do not drive, operate machinery, or make important decisions while taking these medications.   Other Medications:  -Protonix 40mg - 1 tab every 24 hours - to prevent stomach irritation/ulcers  -Senna 8.6mg - 2 pills every 24 hours - stool softener  -Miralax 17g - daily - constipation   Follow up: Please follow up at your prescheduled post-operative follow up appointment with Dr. Ordaz for 2 weeks after hospital discharge. Please call with any questions or concerns including fevers/chills, worsening pain, pus from the wounds, redness of the skin, new or worsening trouble when you swallow, worsening hoarsness or trouble speaking, difficulty breathing or heaviness in the chest at [phone].   Please seek immediate care or call 911 if:   -Your bandage begins to soak with blood  -Your surgical wound breaks open  -You have severe back or leg pain  -You cannot control your bladder or bowel movements  -You have a high fever with nausea and vomiting  -You have painful swelling in your neck AND trouble swallowing  -You have new or worsening trouble moving your neck, arms, or legs    
brown discharge

## 2024-09-24 NOTE — PHYSICAL THERAPY INITIAL EVALUATION ADULT - STAIR PATTERN, REHAB EVAL
Patient called requesting refill on her Advair 250/50 inhaler to be sent to [facility] mail order for 90 day supply.   Last appointment this department: 7/25/2023  Next appointment this department: Visit date not found
step over step

## 2024-09-24 NOTE — PATIENT PROFILE ADULT - NSPROPTRIGHTNOTIFY_GEN_A_NUR
Psychoeducation Group Documentation    PATIENT'S NAME: Jerome Flanagan  MRN:   [id number]  :   1977  ACCT. NUMBER: [id number]  DATE OF SERVICE: 24  START TIME:  8:45 AM  END TIME: 10:45 AM  FACILITATOR(S): Quinten Glaser LADC; Wiley Coats RN  TOPIC: BEH Pyschoeducation  Number of patients attending the group:  25  Group Length:  2 Hours    Skills Group Therapy Type: Healthy behaviors development and Medication education    Summary of Group / Topics Discussed:    Balanced lifestyle skills and Medication management skills        Group Attendance:  Attended group session    Patient's response to the group topic/interactions:  cooperative with task    Patient appeared to be Attentive and Engaged.         Client specific details:  The patient participated in the morning lecture on HIV/AIDS.      
Psychoeducation Group Documentation    PATIENT'S NAME: Jerome Flanagan  MRN:   [id number]  :   1977  ACCT. NUMBER: [id number]  DATE OF SERVICE: 24  START TIME: 12:30 PM  END TIME:  1:30 PM  FACILITATOR(S): Janell Marcum LADC; Quinten Glaser LADC  TOPIC: BEH Pyschoeducation  Number of patients attending the group:  8  Group Length:  1 Hours    Skills Group Therapy Type: Relationship/social skills, Mindfulness/Relaxation skills, and Symptom management skills.    Summary of Group / Topics Discussed:    This patient attended Lecture on  Relationships  and  Self-Apology  Patient engaged in Q&A after the lecture was presented.        Group Attendance:  Attended group session    Patient's response to the group topic/interactions:  cooperative with task    Patient appeared to be Engaged.         Client specific details:  marcello, engaged in lecture discussion and activity.       
declines

## 2024-09-24 NOTE — PROGRESS NOTE ADULT - SUBJECTIVE AND OBJECTIVE BOX
ORTHO POST OP CHECK    S: Pt seen and examined. Doing well postoperatively. Pain well controlled. Denies N/V/CP/SOB.       O:   PE  Gen: NAD  Resp: Nonlabored  HMV in place w/ serosanguinous output  Neuro:  RLE IP 5/5 HS 5/5 Q 5/5 GS 5/5 TA 5/5 EHL 5/5   SILT L2-S1  LLE IP 5/5 HS 5/5 Q 5/5 GS 5/5 TA 5/5 EHL 5/5  SILT L2-S1  WWP BLE                          10.1   6.87  )-----------( 240      ( 24 Sep 2024 11:20 )             31.8     09-24    141  |  107  |  18  ----------------------------<  115[H]  3.9   |  25  |  0.93    Ca    8.6      24 Sep 2024 11:20        Vital Signs Last 24 Hrs  T(C): 36.7 (24 Sep 2024 12:00), Max: 36.8 (24 Sep 2024 11:15)  T(F): 98.1 (24 Sep 2024 12:00), Max: 98.2 (24 Sep 2024 11:15)  HR: 74 (24 Sep 2024 12:45) (71 - 97)  BP: 142/79 (24 Sep 2024 12:45) (127/64 - 153/89)  BP(mean): 98 (24 Sep 2024 12:45) (81 - 102)  RR: 16 (24 Sep 2024 12:45) (10 - 18)  SpO2: 94% (24 Sep 2024 12:45) (94% - 100%)    Parameters below as of 24 Sep 2024 12:00  Patient On (Oxygen Delivery Method): room air      I&O's Summary    24 Sep 2024 07:01  -  24 Sep 2024 12:56  --------------------------------------------------------  IN: 100 mL / OUT: 12 mL / NET: 88 mL        A/P: 79M POD #0 s/p L3-L4 decompression, discectomy    - Neuro: Multimodal pain control  - Resp: IS  - GI: reg diet  - MSK: OOB, WBAT, PT/OT  - DVT: venodynes  - FU AM labs  - Dispo planning ORTHO POST OP CHECK    S: Pt seen and examined. Doing well postoperatively. Pain well controlled. Denies N/V/CP/SOB.       O:   PE  Gen: NAD  Resp: Nonlabored  HMV in place w/ serosanguinous output  Neuro:  RLE IP 5/5 HS 5/5 Q 5/5 GS 5/5 TA 5/5 EHL 5/5   SILT L2-S1  LLE IP 5/5 HS 5/5 Q 5/5 GS 5/5 TA 5/5 EHL 5/5  SILT L2-S1  WWP BLE                          10.1   6.87  )-----------( 240      ( 24 Sep 2024 11:20 )             31.8     09-24    141  |  107  |  18  ----------------------------<  115[H]  3.9   |  25  |  0.93    Ca    8.6      24 Sep 2024 11:20        Vital Signs Last 24 Hrs  T(C): 36.7 (24 Sep 2024 12:00), Max: 36.8 (24 Sep 2024 11:15)  T(F): 98.1 (24 Sep 2024 12:00), Max: 98.2 (24 Sep 2024 11:15)  HR: 74 (24 Sep 2024 12:45) (71 - 97)  BP: 142/79 (24 Sep 2024 12:45) (127/64 - 153/89)  BP(mean): 98 (24 Sep 2024 12:45) (81 - 102)  RR: 16 (24 Sep 2024 12:45) (10 - 18)  SpO2: 94% (24 Sep 2024 12:45) (94% - 100%)    Parameters below as of 24 Sep 2024 12:00  Patient On (Oxygen Delivery Method): room air      I&O's Summary    24 Sep 2024 07:01  -  24 Sep 2024 12:56  --------------------------------------------------------  IN: 100 mL / OUT: 12 mL / NET: 88 mL        A/P: 79M POD #0 s/p L3-L4 decompression, discectomy    - Neuro: Multimodal pain control  - Resp: IS  - GI: reg diet  - MSK: OOB, WBAT, PT/OT  - DVT: venodynes  - FU AM labs  - Dispo planning    Patient seen postoperatively doing well.  Complete resolution of his preoperative left leg pain.  His incisional pain was controlled.  I discussed with the above and with his wife, Dr. Bryce Ordaz.

## 2024-09-24 NOTE — DISCHARGE NOTE PROVIDER - NSDCFUSCHEDAPPT_GEN_ALL_CORE_FT
Bryce Ordaz  [facility]  ORTHOSURG [address] [facility]  Scheduled Appointment: 10/07/2024

## 2024-09-24 NOTE — DISCHARGE NOTE PROVIDER - HOSPITAL COURSE
This is a 80yo Male with PMH of bladder Ca, GERD, HTN, HLD who presents to [facility] for orthopedic surgery. Patient s/p L3-5 lami/disc with Dr. Ordaz on 9/24/24. Patient tolerated the procedure well without any intraoperative complications. Patient tolerated physical therapy, weight bearing as tolerated and pain was controlled. Seen by medical attending for continuity of care and management and cleared for safe discharge. As per surgeon, the patient is stable and ready for discharge. DO NOT take any NSAIDS (motrin, advil, ibuprofen, aleve), Aspirin, Anti-inflammatory medications unless instructed by your orthopedic surgeon to continue. Avoid any heavy lifting, bending, squatting, or twisting motions. Keep dressing/incision clean, dry and intact, may remove dressing two days after discharge and leave incision open to air. Any sutures/staples to be removed on post-op day #14 at your office visit. Please follow up with Dr. Ordaz in 2 weeks. Please follow up with your PMD for continuity of care and management as medications may have changed.

## 2024-09-24 NOTE — DISCHARGE NOTE PROVIDER - CARE PROVIDER_API CALL
Bryce Ordaz  Orthopaedic Surgery  [address] [facility] [address]  [place] [address]  Phone: [phone]  Fax: [phone]  Follow Up Time:

## 2024-09-24 NOTE — PHYSICAL THERAPY INITIAL EVALUATION ADULT - GENERAL OBSERVATIONS, REHAB EVAL
Statement Selected Upon entry, pt semi-supine in bed in NAD; + hemovac. /83 HR 94 bpm SpO2 96% on RA. Wife present at bedside. Pt left as received with all tubes/lines intact, bed alarm on, call bell in reach and in NAD.

## 2024-09-24 NOTE — PHYSICAL THERAPY INITIAL EVALUATION ADULT - PHYSICAL ASSIST/NONPHYSICAL ASSIST: SIT/STAND, REHAB EVAL
Varicose Veins  Varicose veins are veins that have become enlarged, bulged, and twisted. They most often appear in the legs.    What are the causes?  This condition is caused by damage to the valves in the vein. These valves help blood return to your heart. When they are damaged and they stop working properly, blood may flow backward and back up in the veins near the skin, causing the veins to get larger and appear twisted.    The condition can result from any issue that causes blood to back up, like pregnancy, prolonged standing, or obesity.    What increases the risk?  This condition is more likely to develop in people who are:  On their feet a lot.  Pregnant.  Overweight.  What are the signs or symptoms?  Symptoms of this condition include:  Bulging, twisted, and bluish veins.  A feeling of heaviness. This may be worse at the end of the day.  Leg pain. This may be worse at the end of the day.  Swelling in the leg.  Changes in skin color over the veins.  How is this diagnosed?  This condition may be diagnosed based on your symptoms, a physical exam, and an ultrasound test.    How is this treated?  Treatment for this condition may involve:  Avoiding sitting or standing in one position for long periods of time.  Wearing compression stockings. These stockings help to prevent blood clots and reduce swelling in the legs.  Raising (elevating) the legs when resting.  Losing weight.  Exercising regularly.  If you have persistent symptoms or want to improve the way your varicose veins look, you may choose to have a procedure to close the varicose veins off or to remove them.    Treatments to close off the veins include:  Sclerotherapy. In this treatment, a solution is injected into a vein to close it off.  Laser treatment. In this treatment, the vein is heated with a laser to close it off.  Radiofrequency vein ablation. In this treatment, an electrical current produced by radio waves is used to close off the vein.  Treatments to remove the veins include:  Phlebectomy. In this treatment, the veins are removed through small incisions made over the veins.  Vein ligation and stripping. In this treatment, incisions are made over the veins. The veins are then removed after being tied (ligated) with stitches (sutures).  Follow these instructions at home:  Activity     Walk as much as possible. Walking increases blood flow. This helps blood return to the heart and takes pressure off your veins. It also increases your cardiovascular strength.  Follow your health care provider's instructions about exercising.  Do not stand or sit in one position for a long period of time.  Do not sit with your legs crossed.  Rest with your legs raised during the day.  General instructions     Follow any diet instructions given to you by your health care provider.  Wear compression stockings as directed by your health care provider. Do not wear other kinds of tight clothing around your legs, pelvis, or waist.  Elevate your legs at night to above the level of your heart.  If you get a cut in the skin over the varicose vein and the vein bleeds:  Lie down with your leg raised.  Apply firm pressure to the cut with a clean cloth until the bleeding stops.  Place a bandage (dressing) on the cut.  Contact a health care provider if:  The skin around your varicose veins starts to break down.  You have pain, redness, tenderness, or hard swelling over a vein.  You are uncomfortable because of pain.  You get a cut in the skin over a varicose vein and it will not stop bleeding.    Summary  Varicose veins are veins that have become enlarged, bulged, and twisted. They most often appear in the legs.  This condition is caused by damage to the valves in the vein. These valves help blood return to your heart.  Treatment for this condition includes frequent movements, wearing compression stockings, losing weight, and exercising regularly. In some cases, procedures are done to close off or remove the veins.  Treatment for this condition may include wearing compression stockings, elevating the legs, losing weight, and engaging in regular activity. In some cases, procedures are done to close off or remove the veins.  This information is not intended to replace advice given to you by your health care provider. Make sure you discuss any questions you have with your health care provider
verbal cues/nonverbal cues (demo/gestures)/1 person assist

## 2024-09-24 NOTE — CONSULT NOTE ADULT - ASSESSMENT
79M with PMH of HTN, HLD, GERD, bladder ca s/p cystectomy and neobladder (2014), osteoarthritis s/p bilateral knee replacements, chronic back pain w/ radiculopathy LLE admitted s/p L3-4 decompression and diskectomy.    #Chronic radicular back pain s/p L3-4 decompression/diskectomy  -Pain control per team.  -PT eval.  -DVT ppx as per team.    #Anemia  -Chronic stable by labs.  -Microcytic anemia -> MCV 60.   -Further follow up as OP and ensure age-appropriate malignancy screening.     #HLD  -Cont statin.    #Bladder ca  -Stable. In remission.  -F/u as OP.    #GERD  -PPI.    #DVT ppx- Per primary team.

## 2024-09-24 NOTE — DISCHARGE NOTE PROVIDER - NSDCCPCAREPLAN_GEN_ALL_CORE_FT
PRINCIPAL DISCHARGE DIAGNOSIS  Diagnosis: Radiculopathy, lumbar region  Assessment and Plan of Treatment: IMPORTANT: *DO NOT resume any anticoagulation/blood thinners (Aspirin, Plavix, Eliquis, Coumadin, Xarelto, ect.) until instructed by your surgeon.*  *DO NOT take any NSAIDs (Motrin, Aleve, Advil, Ibuprofen, Celebrex, ect.) until instructed by  your surgeon.*  Diet: Continue regular diet upon discharge.   Activity: No heavy lifting. Avoid straining or excessive activity. DO NOT sit for long periods of time.   -DO NOT bend, twist, or lift heavy objects for at least 3 months.   -DO NOT drive until cleared by your surgeon.   -To reduce strain/pressure on your spine place a pillow under your knees when lying on your back. Place a pillow between your knees when lying on your side.   -When you sit put your feet up on a foot rest. DO NOT lie on your stomach or with your legs flat.  -If you need to bend over, bend at your knees, not your back.  -We encourage you to take many short walks after your surgery to help blood move through your body and to prevent blood clots from forming. If you feel weak or dizzy, sit or lie down right away.   Dressings: Keep dressing clean, dry, and intact. Remove all cotton and yellow gauze 72 hours after surgery. You do not need to put a new dressing on your wound unless there is light drainage. If that occurs place Band-Aids on your wound.   Other Care:  -You may shower 72 hours after surgery but DO NOT soak dressing and/or incision. The water may run over your dressing/incision but DO NOT let the water directly hit your dressing/incision (take a shower with your wound away from the direct stream of water).  NO hot tubes, NO bath tubs, NO swimming pools.

## 2024-09-24 NOTE — BRIEF OPERATIVE NOTE - NSICDXBRIEFPROCEDURE_GEN_ALL_CORE_FT
PROCEDURES:  Lumbar laminectomy with discectomy by posterior approach 24-Sep-2024 11:22:13  Elpidio Santos

## 2024-09-24 NOTE — CONSULT NOTE ADULT - SUBJECTIVE AND OBJECTIVE BOX
Matthias COOPER ([place]) MD [facility]  Division of Hospital Medicine  Pager [phone]    HPI  79M with PMH of HTN, HLD, GERD, bladder ca s/p cystectomy and neobladder (2014), osteoarthritis s/p bilateral knee replacements, chronic back pain w/ radiculopathy LLE who presents to the hospital for L3-4 decompression and diskectomy. OR course eventful. Patient states he has 8/10 pain - just received pain meds. No NV. Had some ginger ale which he tolerated. Has not voided yet. Otherwise, no new numbness/tingling.      ROS:  All systems negative except as above.    PAST MEDICAL & SURGICAL HISTORY:  Bladder cancer      HTN (hypertension)      HLD (hyperlipidemia)      Radiculopathy, lumbar region      GERD (gastroesophageal reflux disease)      History of bilateral knee arthroplasty      Malignant tumor of urinary bladder      Allergies:   No Known Allergies    Meds:  MEDICATIONS  (STANDING):  acetaminophen     Tablet .. 975 milliGRAM(s) Oral every 8 hours  atorvastatin 10 milliGRAM(s) Oral at bedtime  ceFAZolin   IVPB 2000 milliGRAM(s) IV Intermittent every 8 hours  chlorhexidine 2% Cloths 1 Application(s) Topical daily  cyclobenzaprine 5 milliGRAM(s) Oral every 8 hours  gabapentin 100 milliGRAM(s) Oral three times a day  lactated ringers. 1000 milliLiter(s) (100 mL/Hr) IV Continuous <Continuous>  losartan 100 milliGRAM(s) Oral daily  pantoprazole    Tablet 40 milliGRAM(s) Oral before breakfast  polyethylene glycol 3350 17 Gram(s) Oral daily  senna 2 Tablet(s) Oral at bedtime  sodium chloride 0.9% Bolus 500 milliLiter(s) IV Bolus once  sodium chloride 0.9% lock flush 3 milliLiter(s) IV Push every 8 hours  sodium chloride 0.9%. 1000 milliLiter(s) (100 mL/Hr) IV Continuous <Continuous>  traMADol 50 milliGRAM(s) Oral every 6 hours    MEDICATIONS  (PRN):  HYDROmorphone  Injectable 0.5 milliGRAM(s) IV Push every 10 minutes PRN Severe Pain (7 - 10)  magnesium hydroxide Suspension 30 milliLiter(s) Oral every 12 hours PRN Constipation  ondansetron Injectable 4 milliGRAM(s) IV Push once PRN Nausea and/or Vomiting  ondansetron Injectable 4 milliGRAM(s) IV Push every 6 hours PRN Nausea and/or Vomiting  oxyCODONE    IR 5 milliGRAM(s) Oral once PRN Moderate Pain (4 - 6)  oxyCODONE    IR 2.5 milliGRAM(s) Oral every 6 hours PRN Moderate Pain (4 - 6)  oxyCODONE    IR 5 milliGRAM(s) Oral every 4 hours PRN Moderate Pain (4 - 6)      SHx:  Denies tobacco/ETOH/drug use.  Retired.  Independent w/ ADLs.    FHx:  DM - father      Physical Exam:  Vital Signs Last 24 Hrs  T(C): 36.5 (24 Sep 2024 13:00), Max: 36.8 (24 Sep 2024 11:15)  T(F): 97.7 (24 Sep 2024 13:00), Max: 98.2 (24 Sep 2024 11:15)  HR: 86 (24 Sep 2024 13:30) (71 - 97)  BP: 149/76 (24 Sep 2024 13:30) (127/64 - 153/89)  BP(mean): 98 (24 Sep 2024 13:30) (81 - 102)  RR: 14 (24 Sep 2024 13:30) (10 - 19)  SpO2: 96% (24 Sep 2024 13:30) (94% - 100%)    Parameters below as of 24 Sep 2024 12:00  Patient On (Oxygen Delivery Method): room air    Gen- In bed, comfortable, NAD  Resp- CTAB, normal effort. no r/r/w.  CVS- RRR, S1S2, no g/r/m.  GI- Soft abd, NT, ND, +BSx4  Ext- No C/C.   Neuro- CN II-XII intact. Speech fluent/face symmetric.     Labs:  CBC Full  -  ( 24 Sep 2024 11:20 )  WBC Count : 6.87 K/uL  RBC Count : 5.34 M/uL  Hemoglobin : 10.1 g/dL  Hematocrit : 31.8 %  Platelet Count - Automated : 240 K/uL  Mean Cell Volume : 59.6 fL  Mean Cell Hemoglobin : 18.9 pg  Mean Cell Hemoglobin Concentration : 31.8 gm/dL  Auto Neutrophil # : 5.97 K/uL  Auto Lymphocyte # : 0.52 K/uL  Auto Monocyte # : 0.27 K/uL  Auto Eosinophil # : 0.02 K/uL  Auto Basophil # : 0.02 K/uL  Auto Neutrophil % : 86.9 %  Auto Lymphocyte % : 7.6 %  Auto Monocyte % : 3.9 %  Auto Eosinophil % : 0.3 %  Auto Basophil % : 0.3 %    09-24    141  |  107  |  18  ----------------------------<  115[H]  3.9   |  25  |  0.93    Ca    8.6      24 Sep 2024 11:20          Imaging:

## 2024-09-24 NOTE — PACU DISCHARGE NOTE - MENTAL STATUS: PATIENT PARTICIPATION
Health Maintenance Due   Topic Date Due   • Depression Screening  05/11/2022       Patient is due for topics as listed above but is not proceeding with Depression Screening  at this time.    Awake

## 2024-09-24 NOTE — DISCHARGE NOTE PROVIDER - NSDCMRMEDTOKEN_GEN_ALL_CORE_FT
Infed 50 mg/mL injectable solution: 25 milligram(s) intravenous once IV Push test dose  Infed 50 mg/mL injectable solution: 975 milligram(s) intravenous once Infusion  losartan 100 mg oral tablet: 1 tab(s) orally once a day  Monoferric 100 mg/mL intravenous solution: 1,000 milligram(s) intravenously once infusion  omeprazole 20 mg oral delayed release capsule: 1 cap(s) orally once a day  simvastatin 20 mg oral tablet: 1 tab(s) orally once a day (at bedtime)   acetaminophen 325 mg oral tablet: 3 tab(s) orally every 8 hours  gabapentin 100 mg oral capsule: 1 cap(s) orally 3 times a day MDD: 3 tabs per day  Infed 50 mg/mL injectable solution: 25 milligram(s) intravenous once IV Push test dose  Infed 50 mg/mL injectable solution: 975 milligram(s) intravenous once Infusion  losartan 100 mg oral tablet: 1 tab(s) orally once a day  Monoferric 100 mg/mL intravenous solution: 1,000 milligram(s) intravenously once infusion  naloxone 4 mg/0.1 mL nasal spray: 1 spray(s) intranasally  omeprazole 20 mg oral delayed release capsule: 1 cap(s) orally once a day  oxyCODONE 5 mg oral tablet: 1 tab(s) orally every 4 hours as needed for  severe pain MDD: 6 tabs  senna leaf extract oral tablet: 2 tab(s) orally once a day (at bedtime)  simvastatin 20 mg oral tablet: 1 tab(s) orally once a day (at bedtime)  traMADol 50 mg oral tablet: 1 tab(s) orally every 6 hours as needed for  moderate pain MDD: 4 tabs

## 2024-09-24 NOTE — PHYSICAL THERAPY INITIAL EVALUATION ADULT - NSPTDISCHREC_GEN_A_CORE
Recommend discharge home; patient to follow-up for outpatient physical therapy script at surgeon's office upon follow-up visit./Outpatient PT

## 2024-09-25 ENCOUNTER — TRANSCRIPTION ENCOUNTER (OUTPATIENT)
Age: 79
End: 2024-09-25

## 2024-09-25 VITALS
DIASTOLIC BLOOD PRESSURE: 77 MMHG | SYSTOLIC BLOOD PRESSURE: 145 MMHG | RESPIRATION RATE: 18 BRPM | HEART RATE: 97 BPM | TEMPERATURE: 98 F | OXYGEN SATURATION: 96 %

## 2024-09-25 LAB
ANION GAP SERPL CALC-SCNC: 10 MMOL/L — SIGNIFICANT CHANGE UP (ref 7–14)
BASOPHILS # BLD AUTO: 0.01 K/UL — SIGNIFICANT CHANGE UP (ref 0–0.2)
BASOPHILS NFR BLD AUTO: 0.1 % — SIGNIFICANT CHANGE UP (ref 0–2)
BUN SERPL-MCNC: 17 MG/DL — SIGNIFICANT CHANGE UP (ref 7–23)
CALCIUM SERPL-MCNC: 8.4 MG/DL — SIGNIFICANT CHANGE UP (ref 8.4–10.5)
CHLORIDE SERPL-SCNC: 107 MMOL/L — SIGNIFICANT CHANGE UP (ref 98–107)
CO2 SERPL-SCNC: 24 MMOL/L — SIGNIFICANT CHANGE UP (ref 22–31)
CREAT SERPL-MCNC: 0.84 MG/DL — SIGNIFICANT CHANGE UP (ref 0.5–1.3)
EGFR: 88 ML/MIN/1.73M2 — SIGNIFICANT CHANGE UP
EOSINOPHIL # BLD AUTO: 0.01 K/UL — SIGNIFICANT CHANGE UP (ref 0–0.5)
EOSINOPHIL NFR BLD AUTO: 0.1 % — SIGNIFICANT CHANGE UP (ref 0–6)
GLUCOSE SERPL-MCNC: 108 MG/DL — HIGH (ref 70–99)
HCT VFR BLD CALC: 31.9 % — LOW (ref 39–50)
HGB BLD-MCNC: 10 G/DL — LOW (ref 13–17)
IANC: 8.36 K/UL — HIGH (ref 1.8–7.4)
IMM GRANULOCYTES NFR BLD AUTO: 0.6 % — SIGNIFICANT CHANGE UP (ref 0–0.9)
LYMPHOCYTES # BLD AUTO: 1.15 K/UL — SIGNIFICANT CHANGE UP (ref 1–3.3)
LYMPHOCYTES # BLD AUTO: 10.4 % — LOW (ref 13–44)
MCHC RBC-ENTMCNC: 18.8 PG — LOW (ref 27–34)
MCHC RBC-ENTMCNC: 31.3 GM/DL — LOW (ref 32–36)
MCV RBC AUTO: 60 FL — LOW (ref 80–100)
MONOCYTES # BLD AUTO: 1.47 K/UL — HIGH (ref 0–0.9)
MONOCYTES NFR BLD AUTO: 13.3 % — SIGNIFICANT CHANGE UP (ref 2–14)
NEUTROPHILS # BLD AUTO: 8.36 K/UL — HIGH (ref 1.8–7.4)
NEUTROPHILS NFR BLD AUTO: 75.5 % — SIGNIFICANT CHANGE UP (ref 43–77)
NRBC # BLD: 0 /100 WBCS — SIGNIFICANT CHANGE UP (ref 0–0)
NRBC # FLD: 0 K/UL — SIGNIFICANT CHANGE UP (ref 0–0)
PLATELET # BLD AUTO: 238 K/UL — SIGNIFICANT CHANGE UP (ref 150–400)
POTASSIUM SERPL-MCNC: 3.7 MMOL/L — SIGNIFICANT CHANGE UP (ref 3.5–5.3)
POTASSIUM SERPL-SCNC: 3.7 MMOL/L — SIGNIFICANT CHANGE UP (ref 3.5–5.3)
RBC # BLD: 5.32 M/UL — SIGNIFICANT CHANGE UP (ref 4.2–5.8)
RBC # FLD: 17.1 % — HIGH (ref 10.3–14.5)
SODIUM SERPL-SCNC: 141 MMOL/L — SIGNIFICANT CHANGE UP (ref 135–145)
WBC # BLD: 11.07 K/UL — HIGH (ref 3.8–10.5)
WBC # FLD AUTO: 11.07 K/UL — HIGH (ref 3.8–10.5)

## 2024-09-25 PROCEDURE — 99232 SBSQ HOSP IP/OBS MODERATE 35: CPT

## 2024-09-25 RX ORDER — GABAPENTIN 800 MG/1
1 TABLET, FILM COATED ORAL
Qty: 90 | Refills: 0
Start: 2024-09-25 | End: 2024-10-24

## 2024-09-25 RX ORDER — OXYCODONE HYDROCHLORIDE 30 MG/1
1 TABLET, FILM COATED, EXTENDED RELEASE ORAL
Qty: 30 | Refills: 0
Start: 2024-09-25 | End: 2024-09-29

## 2024-09-25 RX ORDER — ACETAMINOPHEN 325 MG
3 TABLET ORAL
Qty: 126 | Refills: 0
Start: 2024-09-25 | End: 2024-10-08

## 2024-09-25 RX ORDER — TRAMADOL HYDROCHLORIDE 50 MG/1
1 TABLET, COATED ORAL
Qty: 20 | Refills: 0
Start: 2024-09-25 | End: 2024-09-29

## 2024-09-25 RX ORDER — NALOXONE HYDROCHLORIDE 0.4 MG/ML
1 INJECTION, SOLUTION INTRAMUSCULAR; INTRAVENOUS; SUBCUTANEOUS
Qty: 1 | Refills: 0
Start: 2024-09-25

## 2024-09-25 RX ORDER — SENNOSIDES 8.6 MG
2 TABLET ORAL
Qty: 60 | Refills: 0
Start: 2024-09-25 | End: 2024-10-24

## 2024-09-25 RX ADMIN — GABAPENTIN 100 MILLIGRAM(S): 800 TABLET, FILM COATED ORAL at 14:03

## 2024-09-25 RX ADMIN — Medication 5 MILLIGRAM(S): at 05:24

## 2024-09-25 RX ADMIN — Medication 975 MILLIGRAM(S): at 07:22

## 2024-09-25 RX ADMIN — GABAPENTIN 100 MILLIGRAM(S): 800 TABLET, FILM COATED ORAL at 05:25

## 2024-09-25 RX ADMIN — Medication 3 MILLILITER(S): at 06:11

## 2024-09-25 RX ADMIN — TRAMADOL HYDROCHLORIDE 50 MILLIGRAM(S): 50 TABLET, COATED ORAL at 14:03

## 2024-09-25 RX ADMIN — TRAMADOL HYDROCHLORIDE 50 MILLIGRAM(S): 50 TABLET, COATED ORAL at 01:30

## 2024-09-25 RX ADMIN — PANTOPRAZOLE SODIUM 40 MILLIGRAM(S): 40 TABLET, DELAYED RELEASE ORAL at 05:24

## 2024-09-25 RX ADMIN — LOSARTAN POTASSIUM 100 MILLIGRAM(S): 100 TABLET, FILM COATED ORAL at 05:24

## 2024-09-25 RX ADMIN — TRAMADOL HYDROCHLORIDE 50 MILLIGRAM(S): 50 TABLET, COATED ORAL at 00:30

## 2024-09-25 RX ADMIN — Medication 975 MILLIGRAM(S): at 00:38

## 2024-09-25 RX ADMIN — Medication 3 MILLILITER(S): at 14:03

## 2024-09-25 RX ADMIN — Medication 975 MILLIGRAM(S): at 08:52

## 2024-09-25 RX ADMIN — Medication 1000 MILLILITER(S): at 05:26

## 2024-09-25 RX ADMIN — Medication 17 GRAM(S): at 12:32

## 2024-09-25 RX ADMIN — TRAMADOL HYDROCHLORIDE 50 MILLIGRAM(S): 50 TABLET, COATED ORAL at 07:22

## 2024-09-25 RX ADMIN — TRAMADOL HYDROCHLORIDE 50 MILLIGRAM(S): 50 TABLET, COATED ORAL at 15:03

## 2024-09-25 RX ADMIN — Medication 100 MILLIGRAM(S): at 00:28

## 2024-09-25 NOTE — PROGRESS NOTE ADULT - SUBJECTIVE AND OBJECTIVE BOX
[facility] Division of Hospital Medicine  Matthias Perez) MD [facility]  Pager [phone]    SUBJECTIVE:  Chief complaint: Back pain.    Pt was seen and evaluated at bedside this AM. No o/n events. Denies any SOB/CP/NV. Feels well. Pain controlled - 4-5/10. No bowel/urinary complaints. Swelling is down.      ROS: All systems negative except as noted.      Vital Signs Last 24 Hrs  T(C): 36.4 (25 Sep 2024 09:15), Max: 36.8 (25 Sep 2024 04:58)  T(F): 97.6 (25 Sep 2024 09:15), Max: 98.3 (25 Sep 2024 04:58)  HR: 96 (25 Sep 2024 09:15) (71 - 100)  BP: 121/61 (25 Sep 2024 09:15) (100/58 - 152/80)  BP(mean): 97 (24 Sep 2024 14:15) (96 - 102)  RR: 18 (25 Sep 2024 09:15) (13 - 19)  SpO2: 96% (25 Sep 2024 09:15) (94% - 100%)    Parameters below as of 25 Sep 2024 09:15  Patient On (Oxygen Delivery Method): room air        PHYSICAL EXAM:  Gen- In bed, comfortable, NAD  Resp- CTAB, good effort.  CVS- RRR, S1S2, no g/r/m.  GI- Soft abd, NT, ND, +BSx4  Ext - C/C.  Neuro- CN II-XII intact. Speech fluent/face symmetric.      MEDICATION:  MEDICATIONS  (STANDING):  acetaminophen     Tablet .. 975 milliGRAM(s) Oral every 8 hours  atorvastatin 10 milliGRAM(s) Oral at bedtime  chlorhexidine 2% Cloths 1 Application(s) Topical daily  cyclobenzaprine 5 milliGRAM(s) Oral every 8 hours  gabapentin 100 milliGRAM(s) Oral three times a day  losartan 100 milliGRAM(s) Oral daily  pantoprazole    Tablet 40 milliGRAM(s) Oral before breakfast  polyethylene glycol 3350 17 Gram(s) Oral daily  senna 2 Tablet(s) Oral at bedtime  sodium chloride 0.9% lock flush 3 milliLiter(s) IV Push every 8 hours  sodium chloride 0.9%. 1000 milliLiter(s) (100 mL/Hr) IV Continuous <Continuous>  traMADol 50 milliGRAM(s) Oral every 6 hours    MEDICATIONS  (PRN):  magnesium hydroxide Suspension 30 milliLiter(s) Oral every 12 hours PRN Constipation  ondansetron Injectable 4 milliGRAM(s) IV Push every 6 hours PRN Nausea and/or Vomiting  oxyCODONE    IR 2.5 milliGRAM(s) Oral every 6 hours PRN Moderate Pain (4 - 6)  oxyCODONE    IR 5 milliGRAM(s) Oral every 4 hours PRN Moderate Pain (4 - 6)            LABORATORY:                          10.0   11.07 )-----------( 238      ( 25 Sep 2024 05:39 )             31.9     09-25    141  |  107  |  17  ----------------------------<  108[H]  3.7   |  24  |  0.84    Ca    8.4      25 Sep 2024 05:39        Urinalysis Basic - ( 25 Sep 2024 05:39 )    Color: x / Appearance: x / SG: x / pH: x  Gluc: 108 mg/dL / Ketone: x  / Bili: x / Urobili: x   Blood: x / Protein: x / Nitrite: x   Leuk Esterase: x / RBC: x / WBC x   Sq Epi: x / Non Sq Epi: x / Bacteria: x

## 2024-09-25 NOTE — DISCHARGE NOTE NURSING/CASE MANAGEMENT/SOCIAL WORK - PATIENT PORTAL LINK FT
You can access the FollowMyHealth Patient Portal offered by [facility] by registering at the following website: [email or web address]. By joining [facility]’s FollowMyHealth portal, you will also be able to view your health information using other applications (apps) compatible with our system.

## 2024-09-25 NOTE — PROGRESS NOTE ADULT - SUBJECTIVE AND OBJECTIVE BOX
Orthopaedic Surgery Progress Note    Subjective:   Patient seen and examined. No acute events overnight. States pain is controlled. Denies fever/chills/chest pain/shortness of breath/numbness/tingling.    Objective:  T(C): 36.8 (09-25-24 @ 04:58), Max: 36.8 (09-24-24 @ 11:15)  HR: 97 (09-25-24 @ 04:58) (71 - 100)  BP: 118/60 (09-25-24 @ 04:58) (100/58 - 153/89)  RR: 18 (09-25-24 @ 04:58) (10 - 19)  SpO2: 96% (09-25-24 @ 04:58) (94% - 100%)  Wt(kg): --    09-24 @ 07:01  -  09-25 @ 06:24  --------------------------------------------------------  IN: 520 mL / OUT: 330.5 mL / NET: 189.5 mL      O:   PE  Gen: NAD  Resp: Nonlabored  HMV in place w/ serosanguinous output  Neuro:  RLE IP 5/5 HS 5/5 Q 5/5 GS 5/5 TA 5/5 EHL 5/5   SILT L2-S1  LLE IP 5/5 HS 5/5 Q 5/5 GS 5/5 TA 5/5 EHL 5/5  SILT L2-S1  WWP BLE                          10.1   6.87  )-----------( 240      ( 24 Sep 2024 11:20 )             31.8     09-24    141  |  107  |  18  ----------------------------<  115[H]  3.9   |  25  |  0.93    Ca    8.6      24 Sep 2024 11:20        Urinalysis Basic - ( 24 Sep 2024 11:20 )    Color: x / Appearance: x / SG: x / pH: x  Gluc: 115 mg/dL / Ketone: x  / Bili: x / Urobili: x   Blood: x / Protein: x / Nitrite: x   Leuk Esterase: x / RBC: x / WBC x   Sq Epi: x / Non Sq Epi: x / Bacteria: x

## 2024-09-25 NOTE — OCCUPATIONAL THERAPY INITIAL EVALUATION ADULT - PERTINENT HX OF CURRENT PROBLEM, REHAB EVAL
Pt is a 80 y/o male presents for  preop evaluation for scheduled L3-L4 lumbar laminectomy.  Pt reports having lower back pain with radiation down the L LE since June 2024. Pt states recent ER visit  for worsening l of pain in lower back. Preop dx radiculopathy lumbar region & other intervertebral disc displacement lumbar region.

## 2024-09-25 NOTE — PROGRESS NOTE ADULT - ASSESSMENT
79M with PMH of HTN, HLD, GERD, bladder ca s/p cystectomy and neobladder (2014), osteoarthritis s/p bilateral knee replacements, chronic back pain w/ radiculopathy LLE admitted s/p L3-4 decompression and diskectomy.    #Chronic radicular back pain s/p L3-4 decompression/diskectomy  -Pain control per team.  -PT rec OP PT.  -DVT ppx as per team.    #Anemia  -Chronic stable by labs.  -Microcytic anemia -> MCV 60. Further follow up as OP and ensure age-appropriate malignancy screening.     #HLD  -Cont statin.    #Bladder ca  -Stable. In remission.  -F/u as OP.    #GERD  -PPI.    #DVT ppx- Per primary team.    #Dispo- To home when cleared by primary.

## 2024-09-25 NOTE — DISCHARGE NOTE NURSING/CASE MANAGEMENT/SOCIAL WORK - NSDCPNINST_GEN_ALL_CORE
Notify MD of temp 101, chills, increased pain not relieved with pain medication, increased drainage at incision site, or new onset of numbness or tingling.  Make sure to take over the counter stool softeners if taking pain medication as constipation is a major side effect   If you cannot make your follow up appointment, make sure to call and reschedule. You have a post op appointment with Dr. Ordaz on October 7, 2024 @ 12noon at the [address] [place] [address] office. If you are unable to keep this appointment, please call the office to reschedule. Notify MD of temp 101, chills, increased pain not relieved with pain medication, increased drainage at incision site, or new onset of numbness or tingling.  Make sure to take over the counter stool softeners if taking pain medication as constipation is a major side effect   If you cannot make your follow up appointment, make sure to call and reschedule.

## 2024-09-25 NOTE — PROGRESS NOTE ADULT - SUBJECTIVE AND OBJECTIVE BOX
ANESTHESIA POSTOP CHECK    79y Male POSTOP DAY 1     No COMPLAINTS    NO APPARENT ANESTHESIA COMPLICATIONS

## 2024-09-25 NOTE — OCCUPATIONAL THERAPY INITIAL EVALUATION ADULT - PREDICTED DURATION OF THERAPY (DAYS/WKS), OT EVAL
No OT services needed at this time due to pt at baseline for ADL performance. Will discharge the pt.

## 2024-09-25 NOTE — PROGRESS NOTE ADULT - ASSESSMENT
A/P: 79M POD #1 s/p L3-L4 decompression, discectomy    - Neuro: Multimodal pain control  - Resp: IS  - GI: reg diet  - MSK: OOB, WBAT, PT/OT  - DVT: venodynes  - FU AM labs  - Dispo planning to home A/P: 79M POD #1 s/p L3-L4 decompression, discectomy    - Neuro: Multimodal pain control  - Resp: IS  - GI: reg diet  - MSK: OOB, WBAT, PT/OT  - DVT: venodynes  - FU AM labs  - Dispo planning to home    Patient doing very well.  Complete resolution of his preoperative left leg pain and his incisional pain is controlled.  He is ambulating well with physical therapy.  I discussed with the above, Dr. Bryce Ordaz.

## 2024-09-25 NOTE — DISCHARGE NOTE NURSING/CASE MANAGEMENT/SOCIAL WORK - NSDCPEFALRISK_GEN_ALL_CORE
For information on Fall & Injury Prevention, visit: [email or web address] OR  [email or web address] OR  https://www.cdc.gov/steadi/patient.html

## 2024-09-25 NOTE — OCCUPATIONAL THERAPY INITIAL EVALUATION ADULT - GENERAL OBSERVATIONS, REHAB EVAL
Pt was found lying supine in bed with his wife at bedside. Pt was agreeable to participate in the OT evaluation and followed the directions

## 2024-09-25 NOTE — OCCUPATIONAL THERAPY INITIAL EVALUATION ADULT - PRECAUTIONS/LIMITATIONS, REHAB EVAL
Pt discharge with a back brace that needs to be worn during participation in daily activities./spinal precautions/surgical precautions

## 2024-09-27 LAB — SURGICAL PATHOLOGY STUDY: SIGNIFICANT CHANGE UP

## 2024-10-04 ENCOUNTER — NON-APPOINTMENT (OUTPATIENT)
Age: 79
End: 2024-10-04

## 2024-10-07 ENCOUNTER — APPOINTMENT (OUTPATIENT)
Dept: ORTHOPEDIC SURGERY | Facility: CLINIC | Age: 79
End: 2024-10-07
Payer: MEDICARE

## 2024-10-07 VITALS
HEART RATE: 100 BPM | SYSTOLIC BLOOD PRESSURE: 125 MMHG | HEIGHT: 68 IN | WEIGHT: 175 LBS | BODY MASS INDEX: 26.52 KG/M2 | DIASTOLIC BLOOD PRESSURE: 81 MMHG

## 2024-10-07 DIAGNOSIS — M48.07 SPINAL STENOSIS, LUMBOSACRAL REGION: ICD-10-CM

## 2024-10-07 PROBLEM — M54.16 RADICULOPATHY, LUMBAR REGION: Chronic | Status: ACTIVE | Noted: 2024-09-16

## 2024-10-07 PROBLEM — K21.9 GASTRO-ESOPHAGEAL REFLUX DISEASE WITHOUT ESOPHAGITIS: Chronic | Status: ACTIVE | Noted: 2024-09-16

## 2024-10-07 PROCEDURE — 72100 X-RAY EXAM L-S SPINE 2/3 VWS: CPT

## 2024-10-07 PROCEDURE — 99024 POSTOP FOLLOW-UP VISIT: CPT

## 2024-10-11 ENCOUNTER — NON-APPOINTMENT (OUTPATIENT)
Age: 79
End: 2024-10-11

## 2024-11-06 ENCOUNTER — APPOINTMENT (OUTPATIENT)
Dept: ORTHOPEDIC SURGERY | Facility: CLINIC | Age: 79
End: 2024-11-06
Payer: MEDICARE

## 2024-11-06 VITALS — WEIGHT: 180 LBS | BODY MASS INDEX: 27.28 KG/M2 | HEIGHT: 68 IN

## 2024-11-06 DIAGNOSIS — M48.07 SPINAL STENOSIS, LUMBOSACRAL REGION: ICD-10-CM

## 2024-11-06 PROCEDURE — 72100 X-RAY EXAM L-S SPINE 2/3 VWS: CPT

## 2024-11-06 PROCEDURE — 99024 POSTOP FOLLOW-UP VISIT: CPT

## 2024-11-28 ENCOUNTER — EMERGENCY (EMERGENCY)
Facility: HOSPITAL | Age: 79
LOS: 1 days | Discharge: HOME | End: 2024-11-28
Attending: STUDENT IN AN ORGANIZED HEALTH CARE EDUCATION/TRAINING PROGRAM
Payer: SELF-PAY

## 2024-11-28 VITALS
DIASTOLIC BLOOD PRESSURE: 101 MMHG | HEART RATE: 120 BPM | OXYGEN SATURATION: 94 % | TEMPERATURE: 98 F | RESPIRATION RATE: 17 BRPM | SYSTOLIC BLOOD PRESSURE: 164 MMHG

## 2024-11-28 DIAGNOSIS — Z96.653 PRESENCE OF ARTIFICIAL KNEE JOINT, BILATERAL: Chronic | ICD-10-CM

## 2024-11-28 DIAGNOSIS — Z98.890 OTHER SPECIFIED POSTPROCEDURAL STATES: Chronic | ICD-10-CM

## 2024-11-28 DIAGNOSIS — Z90.6 ACQUIRED ABSENCE OF OTHER PARTS OF URINARY TRACT: Chronic | ICD-10-CM

## 2024-11-28 DIAGNOSIS — C67.9 MALIGNANT NEOPLASM OF BLADDER, UNSPECIFIED: Chronic | ICD-10-CM

## 2024-11-28 LAB
ACANTHOCYTES BLD QL SMEAR: SLIGHT — SIGNIFICANT CHANGE UP
ALBUMIN SERPL ELPH-MCNC: 4.2 G/DL — SIGNIFICANT CHANGE UP (ref 3.3–5.2)
ALP SERPL-CCNC: 155 U/L — HIGH (ref 40–120)
ALT FLD-CCNC: 19 U/L — SIGNIFICANT CHANGE UP
ANION GAP SERPL CALC-SCNC: 15 MMOL/L — SIGNIFICANT CHANGE UP (ref 5–17)
ANISOCYTOSIS BLD QL: SLIGHT — SIGNIFICANT CHANGE UP
AST SERPL-CCNC: 28 U/L — SIGNIFICANT CHANGE UP
BASOPHILS # BLD AUTO: 0.05 K/UL — SIGNIFICANT CHANGE UP (ref 0–0.2)
BASOPHILS NFR BLD AUTO: 0.3 % — SIGNIFICANT CHANGE UP (ref 0–2)
BILIRUB SERPL-MCNC: 0.6 MG/DL — SIGNIFICANT CHANGE UP (ref 0.4–2)
BUN SERPL-MCNC: 17.6 MG/DL — SIGNIFICANT CHANGE UP (ref 8–20)
CALCIUM SERPL-MCNC: 8.8 MG/DL — SIGNIFICANT CHANGE UP (ref 8.4–10.5)
CHLORIDE SERPL-SCNC: 103 MMOL/L — SIGNIFICANT CHANGE UP (ref 96–108)
CO2 SERPL-SCNC: 22 MMOL/L — SIGNIFICANT CHANGE UP (ref 22–29)
CREAT SERPL-MCNC: 0.89 MG/DL — SIGNIFICANT CHANGE UP (ref 0.5–1.3)
EGFR: 87 ML/MIN/1.73M2 — SIGNIFICANT CHANGE UP
ELLIPTOCYTES BLD QL SMEAR: SIGNIFICANT CHANGE UP
EOSINOPHIL # BLD AUTO: 0.01 K/UL — SIGNIFICANT CHANGE UP (ref 0–0.5)
EOSINOPHIL NFR BLD AUTO: 0.1 % — SIGNIFICANT CHANGE UP (ref 0–6)
GLUCOSE SERPL-MCNC: 111 MG/DL — HIGH (ref 70–99)
HCT VFR BLD CALC: 37.8 % — LOW (ref 39–50)
HGB BLD-MCNC: 11.7 G/DL — LOW (ref 13–17)
HYPOCHROMIA BLD QL: SLIGHT — SIGNIFICANT CHANGE UP
IMM GRANULOCYTES NFR BLD AUTO: 1.4 % — HIGH (ref 0–0.9)
LYMPHOCYTES # BLD AUTO: 0.62 K/UL — LOW (ref 1–3.3)
LYMPHOCYTES # BLD AUTO: 3.9 % — LOW (ref 13–44)
MANUAL SMEAR VERIFICATION: SIGNIFICANT CHANGE UP
MCHC RBC-ENTMCNC: 19 PG — LOW (ref 27–34)
MCHC RBC-ENTMCNC: 31 G/DL — LOW (ref 32–36)
MCV RBC AUTO: 61.3 FL — LOW (ref 80–100)
MICROCYTES BLD QL: SLIGHT — SIGNIFICANT CHANGE UP
MONOCYTES # BLD AUTO: 1.13 K/UL — HIGH (ref 0–0.9)
MONOCYTES NFR BLD AUTO: 7.1 % — SIGNIFICANT CHANGE UP (ref 2–14)
NEUTROPHILS # BLD AUTO: 13.83 K/UL — HIGH (ref 1.8–7.4)
NEUTROPHILS NFR BLD AUTO: 87.2 % — HIGH (ref 43–77)
OVALOCYTES BLD QL SMEAR: SIGNIFICANT CHANGE UP
PLAT MORPH BLD: NORMAL — SIGNIFICANT CHANGE UP
PLATELET # BLD AUTO: 275 K/UL — SIGNIFICANT CHANGE UP (ref 150–400)
POIKILOCYTOSIS BLD QL AUTO: SIGNIFICANT CHANGE UP
POLYCHROMASIA BLD QL SMEAR: SLIGHT — SIGNIFICANT CHANGE UP
POTASSIUM SERPL-MCNC: 3.5 MMOL/L — SIGNIFICANT CHANGE UP (ref 3.5–5.3)
POTASSIUM SERPL-SCNC: 3.5 MMOL/L — SIGNIFICANT CHANGE UP (ref 3.5–5.3)
PROT SERPL-MCNC: 6.9 G/DL — SIGNIFICANT CHANGE UP (ref 6.6–8.7)
RBC # BLD: 6.17 M/UL — HIGH (ref 4.2–5.8)
RBC # FLD: 19.1 % — HIGH (ref 10.3–14.5)
RBC BLD AUTO: ABNORMAL
SODIUM SERPL-SCNC: 140 MMOL/L — SIGNIFICANT CHANGE UP (ref 135–145)
TARGETS BLD QL SMEAR: SLIGHT — SIGNIFICANT CHANGE UP
WBC # BLD: 15.87 K/UL — HIGH (ref 3.8–10.5)
WBC # FLD AUTO: 15.87 K/UL — HIGH (ref 3.8–10.5)

## 2024-11-28 PROCEDURE — 71046 X-RAY EXAM CHEST 2 VIEWS: CPT | Mod: 26

## 2024-11-28 PROCEDURE — 93010 ELECTROCARDIOGRAM REPORT: CPT

## 2024-11-28 PROCEDURE — 71260 CT THORAX DX C+: CPT | Mod: 26,MC

## 2024-11-28 PROCEDURE — 99285 EMERGENCY DEPT VISIT HI MDM: CPT

## 2024-11-28 RX ORDER — LIDOCAINE 40 MG/G
1 CREAM TOPICAL ONCE
Refills: 0 | Status: COMPLETED | OUTPATIENT
Start: 2024-11-28 | End: 2024-11-28

## 2024-11-28 RX ORDER — OXYCODONE HYDROCHLORIDE 30 MG/1
10 TABLET ORAL ONCE
Refills: 0 | Status: DISCONTINUED | OUTPATIENT
Start: 2024-11-28 | End: 2024-11-28

## 2024-11-28 RX ORDER — IBUPROFEN 200 MG
600 TABLET ORAL ONCE
Refills: 0 | Status: COMPLETED | OUTPATIENT
Start: 2024-11-28 | End: 2024-11-28

## 2024-11-28 RX ORDER — METHOCARBAMOL 500 MG/1
1500 TABLET, FILM COATED ORAL ONCE
Refills: 0 | Status: COMPLETED | OUTPATIENT
Start: 2024-11-28 | End: 2024-11-28

## 2024-11-28 RX ORDER — LOSARTAN POTASSIUM 100 MG/1
100 TABLET, FILM COATED ORAL
Refills: 0 | Status: ACTIVE | OUTPATIENT
Start: 2024-11-28 | End: 2025-10-27

## 2024-11-28 RX ORDER — PANTOPRAZOLE SODIUM 40 MG/1
40 TABLET, DELAYED RELEASE ORAL
Refills: 0 | Status: ACTIVE | OUTPATIENT
Start: 2024-11-28 | End: 2025-10-27

## 2024-11-28 RX ORDER — OXYCODONE HYDROCHLORIDE 30 MG/1
5 TABLET ORAL ONCE
Refills: 0 | Status: DISCONTINUED | OUTPATIENT
Start: 2024-11-28 | End: 2024-11-28

## 2024-11-28 RX ADMIN — OXYCODONE HYDROCHLORIDE 10 MILLIGRAM(S): 30 TABLET ORAL at 17:00

## 2024-11-28 RX ADMIN — Medication 600 MILLIGRAM(S): at 11:23

## 2024-11-28 RX ADMIN — METHOCARBAMOL 1500 MILLIGRAM(S): 500 TABLET, FILM COATED ORAL at 11:23

## 2024-11-28 RX ADMIN — Medication 4 MILLIGRAM(S): at 12:58

## 2024-11-28 RX ADMIN — Medication 10 MILLIGRAM(S): at 22:05

## 2024-11-28 RX ADMIN — Medication 4 MILLIGRAM(S): at 12:28

## 2024-11-28 RX ADMIN — Medication 600 MILLIGRAM(S): at 11:53

## 2024-11-28 RX ADMIN — OXYCODONE HYDROCHLORIDE 5 MILLIGRAM(S): 30 TABLET ORAL at 22:36
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
## 2024-11-28 NOTE — ED PROVIDER NOTE - DISCHARGE DATE
Two patient identifiers verified.  Allergies reviewed.  Shingrix #2 IM administered to right deltoid per MD order.  Patient tolerated injection well; no redness, bleeding, or bruising noted to injection site.  Patient instructed to remain in clinic setting for 15 minutes.  Verbalizes understanding.     29-Nov-2024

## 2024-11-28 NOTE — ED PROVIDER NOTE - PHYSICAL EXAMINATION
General: Awake, alert  HEENT: Normocephalic, atraumatic. No scleral icterus or conjunctival injection. EOMI. Moist mucous membranes. Oropharynx clear.   Neck:. Soft and supple.  Cardiac: tachycardic to 110s, Peripheral pulses 2+ and symmetric. No LE edema. Mild tenderness to palpation of mid sternum. No crepitus.   Resp: Lungs CTAB. No accessory muscle use  Abd: Soft, non-tender, non-distended. No guarding, rebound, or rigidity.  Back: Spine midline and non-tender.   Skin: No rashes, abrasions, or lacerations.  Neuro: AO x 4. Moves all extremities symmetrically. Motor strength and sensation grossly intact.  Psych: Appropriate mood and affect

## 2024-11-28 NOTE — ED PROVIDER NOTE - NSFOLLOWUPINSTRUCTIONS_ED_ALL_ED_FT
** You have a sternal fracture and a rib fracture. You declined admission for pain control.     ** Take over the counter Tylenol or Ibuprofen for pain -- follow dosing directions on original bottle.      ** Follow up with your primary care doctor in the next 72 hours.     ** Go to the nearest Emergency Department if you experience any new or concerning symptoms, such as:   - worsening pain  - chest pain  - difficulty breathing  - passing out  - unable to eat or drink  - unable to move or feel part of your body  - fever, chills ** You have a sternal fracture and a rib fracture. You declined admission for pain control.     ** Take over the counter Tylenol or Ibuprofen for pain -- follow dosing directions on original bottle.  Stagger them for better pain control.     ** You are prescribed oxycodone for severe breakthrough pain that is not controlled on tylenol and Ibuprofen.     ** Follow up with your primary care doctor in the next 72 hours.     ** Go to the nearest Emergency Department if you experience any new or concerning symptoms, such as:   - worsening pain  - chest pain  - difficulty breathing  - passing out  - unable to eat or drink  - unable to move or feel part of your body  - fever, chills
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
## 2024-11-28 NOTE — ED PROVIDER NOTE - PATIENT PORTAL LINK FT
You can access the FollowMyHealth Patient Portal offered by [facility] by registering at the following website: [email or web address]. By joining [facility]’s FollowMyHealth portal, you will also be able to view your health information using other applications (apps) compatible with our system.

## 2024-11-28 NOTE — ED PROVIDER NOTE - PROGRESS NOTE DETAILS
Spoke with radiologist who was concerned when reading chest CT that there was an injury to patients lower back. Pt just had back surgery 1 month ago for lumbar disc herniations. Pt states he is not having any back pain, no midline tenderness to palpation Spoke with radiologist who was concerned when reading chest CT that there was an injury to patients lower back. Pt just had back surgery 1 month ago for lumbar disc herniations. Pt states he is not having any back pain, no midline tenderness to palpation. Informed radiologist about recent surgery and she stated given history and physical exam, no indication for CT lumbar at this time Maegan: pt signed out to me by Dr. Francisco pending  from daughter tmrw. with sternal fracture and rib fracture. satting well, pain is controlled with po meds. pt ambulatory. denies headache/injury, no neck/back/abd pain and states he otherwise feels very good. has been using incentive spirometry. plan from day team was for pt to be d/major but daughter states can't pick pt up until morning. pt states doesn't want to stay and no other complaints. in current state no further work up initiated d/t this and no trauma consultation. Laly Valentin MD, Attending  Pt Received at signout pending daughter to  at 8 AM.  Patient reassessed at bedside, well-appearing, no complaints, agreeable to going home, with outpatient follow-up  and over-the-counter analgesia. Laly Valentin MD, Attending  Pt Received at signout pending daughter to  at 8 AM. Patient reassessed with daughter at bedside, well-appearing, no complaints, offered admission for pain consult, pt strongly declined. Discussed tachycardia with pt and daughter, both think that pt is having difficulty relaxing, anxiety and pain, in the hospital setting and is in pain. Pt strongly prefers to go home now. Strict return precautions discussed with daughter and patient, both expressed understanding.

## 2024-11-28 NOTE — ED PROVIDER NOTE - CARE PLAN
1 Principal Discharge DX:	Fractured rib  Secondary Diagnosis:	Acute traumatic pain   Principal Discharge DX:	Fractured rib  Secondary Diagnosis:	Acute traumatic pain  Secondary Diagnosis:	Sternal fracture

## 2024-11-28 NOTE — ED ADULT NURSE REASSESSMENT NOTE - NS ED NURSE REASSESS COMMENT FT1
pt provided with incentive spirometer. teach back method performed. pt verbalizes understanding of use of incentive spirometer and able to show RN proper technique and use of incentive spirometer.
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
## 2024-11-29 VITALS
OXYGEN SATURATION: 95 % | RESPIRATION RATE: 18 BRPM | SYSTOLIC BLOOD PRESSURE: 143 MMHG | HEART RATE: 112 BPM | TEMPERATURE: 98 F | DIASTOLIC BLOOD PRESSURE: 91 MMHG

## 2024-11-29 PROCEDURE — 99285 EMERGENCY DEPT VISIT HI MDM: CPT | Mod: 25

## 2024-11-29 PROCEDURE — 96374 THER/PROPH/DIAG INJ IV PUSH: CPT | Mod: XU

## 2024-11-29 PROCEDURE — 93005 ELECTROCARDIOGRAM TRACING: CPT

## 2024-11-29 PROCEDURE — 71046 X-RAY EXAM CHEST 2 VIEWS: CPT

## 2024-11-29 PROCEDURE — 85025 COMPLETE CBC W/AUTO DIFF WBC: CPT

## 2024-11-29 PROCEDURE — 36415 COLL VENOUS BLD VENIPUNCTURE: CPT

## 2024-11-29 PROCEDURE — 71260 CT THORAX DX C+: CPT | Mod: MC

## 2024-11-29 PROCEDURE — 80053 COMPREHEN METABOLIC PANEL: CPT

## 2024-11-29 RX ORDER — IBUPROFEN 200 MG
600 TABLET ORAL ONCE
Refills: 0 | Status: COMPLETED | OUTPATIENT
Start: 2024-11-29 | End: 2024-11-29

## 2024-11-29 RX ORDER — OXYCODONE HYDROCHLORIDE 30 MG/1
5 TABLET ORAL ONCE
Refills: 0 | Status: DISCONTINUED | OUTPATIENT
Start: 2024-11-29 | End: 2024-11-29

## 2024-11-29 RX ORDER — OXYCODONE HYDROCHLORIDE 30 MG/1
1 TABLET ORAL
Qty: 7 | Refills: 0
Start: 2024-11-29

## 2024-11-29 RX ORDER — OXYCODONE HYDROCHLORIDE 30 MG/1
1 TABLET ORAL
Qty: 7 | Refills: 0
Start: 2024-11-29 | End: 2024-12-01

## 2024-11-29 RX ADMIN — OXYCODONE HYDROCHLORIDE 5 MILLIGRAM(S): 30 TABLET ORAL at 04:19

## 2024-11-29 RX ADMIN — OXYCODONE HYDROCHLORIDE 5 MILLIGRAM(S): 30 TABLET ORAL at 03:15

## 2024-11-29 RX ADMIN — Medication 600 MILLIGRAM(S): at 03:15

## 2024-11-29 RX ADMIN — Medication 600 MILLIGRAM(S): at 04:19

## 2024-11-29 RX ADMIN — PANTOPRAZOLE SODIUM 40 MILLIGRAM(S): 40 TABLET, DELAYED RELEASE ORAL at 06:12

## 2024-11-29 NOTE — ED ADULT NURSE REASSESSMENT NOTE - NS ED NURSE REASSESS COMMENT FT1
Took over patient from Mindy MORRELL. Pt is currently pending a discharge disposition and for his daughter to come and pick him up. Ambulating around the ED safely and denies complaints at this time.

## 2024-12-31 NOTE — H&P PST ADULT - REASON FOR ADMISSION
Called and spoke with the patient's son, Maxwell. Told him Dr. Bloom reviewed his labs and stated, \"since the ferritin is technically less than 50, can continue the iron tablet. Have him follow up in about 8 weeks to recheck his labs for ODELL, that would be great and maybe the pill can be stopped at that time if ferritin is normal\". Follow up appointment scheduled with Parvin NASH NP on Thursday, 2/27/2025 at 11:30 am. He stated understanding and thanked me for the call.    "having lower back surgery"

## 2025-06-07 PROBLEM — I10 ESSENTIAL (PRIMARY) HYPERTENSION: Chronic | Status: ACTIVE | Noted: 2024-11-28

## 2025-06-07 PROBLEM — E78.5 HYPERLIPIDEMIA, UNSPECIFIED: Chronic | Status: ACTIVE | Noted: 2024-11-28

## 2025-06-07 PROBLEM — K21.9 GASTRO-ESOPHAGEAL REFLUX DISEASE WITHOUT ESOPHAGITIS: Chronic | Status: ACTIVE | Noted: 2024-11-28

## 2025-06-09 ENCOUNTER — TRANSCRIPTION ENCOUNTER (OUTPATIENT)
Age: 80
End: 2025-06-09

## 2025-06-09 ENCOUNTER — APPOINTMENT (OUTPATIENT)
Dept: ORTHOPEDIC SURGERY | Facility: CLINIC | Age: 80
End: 2025-06-09
Payer: MEDICARE

## 2025-06-09 ENCOUNTER — NON-APPOINTMENT (OUTPATIENT)
Age: 80
End: 2025-06-09

## 2025-06-09 VITALS
SYSTOLIC BLOOD PRESSURE: 151 MMHG | BODY MASS INDEX: 27.47 KG/M2 | HEIGHT: 67 IN | HEART RATE: 91 BPM | DIASTOLIC BLOOD PRESSURE: 82 MMHG | WEIGHT: 175 LBS

## 2025-06-09 PROBLEM — M47.812 CERVICAL SPONDYLOSIS: Status: ACTIVE | Noted: 2025-06-09

## 2025-06-09 PROCEDURE — 72100 X-RAY EXAM L-S SPINE 2/3 VWS: CPT

## 2025-06-09 PROCEDURE — 99214 OFFICE O/P EST MOD 30 MIN: CPT

## 2025-06-09 PROCEDURE — 72040 X-RAY EXAM NECK SPINE 2-3 VW: CPT

## 2025-06-26 ENCOUNTER — APPOINTMENT (OUTPATIENT)
Dept: ORTHOPEDIC SURGERY | Facility: CLINIC | Age: 80
End: 2025-06-26
Payer: MEDICARE

## 2025-06-26 VITALS — HEIGHT: 67 IN | WEIGHT: 175 LBS | BODY MASS INDEX: 27.47 KG/M2

## 2025-06-26 PROBLEM — M16.11 ARTHRITIS OF RIGHT HIP: Status: ACTIVE | Noted: 2025-06-26

## 2025-06-26 PROCEDURE — 99215 OFFICE O/P EST HI 40 MIN: CPT

## 2025-06-26 PROCEDURE — 73502 X-RAY EXAM HIP UNI 2-3 VIEWS: CPT

## 2025-06-26 PROCEDURE — G2211 COMPLEX E/M VISIT ADD ON: CPT

## 2025-07-10 ENCOUNTER — NON-APPOINTMENT (OUTPATIENT)
Age: 80
End: 2025-07-10

## 2025-07-14 ENCOUNTER — NON-APPOINTMENT (OUTPATIENT)
Age: 80
End: 2025-07-14

## 2025-07-17 ENCOUNTER — OUTPATIENT (OUTPATIENT)
Dept: OUTPATIENT SERVICES | Facility: HOSPITAL | Age: 80
LOS: 1 days | End: 2025-07-17
Payer: MEDICARE

## 2025-07-17 VITALS
HEIGHT: 65.75 IN | RESPIRATION RATE: 16 BRPM | SYSTOLIC BLOOD PRESSURE: 136 MMHG | WEIGHT: 182.98 LBS | DIASTOLIC BLOOD PRESSURE: 80 MMHG | TEMPERATURE: 98 F | HEART RATE: 89 BPM | OXYGEN SATURATION: 95 %

## 2025-07-17 DIAGNOSIS — Z90.6 ACQUIRED ABSENCE OF OTHER PARTS OF URINARY TRACT: Chronic | ICD-10-CM

## 2025-07-17 DIAGNOSIS — M16.11 UNILATERAL PRIMARY OSTEOARTHRITIS, RIGHT HIP: ICD-10-CM

## 2025-07-17 DIAGNOSIS — I10 ESSENTIAL (PRIMARY) HYPERTENSION: ICD-10-CM

## 2025-07-17 DIAGNOSIS — E78.5 HYPERLIPIDEMIA, UNSPECIFIED: ICD-10-CM

## 2025-07-17 DIAGNOSIS — Z96.653 PRESENCE OF ARTIFICIAL KNEE JOINT, BILATERAL: Chronic | ICD-10-CM

## 2025-07-17 DIAGNOSIS — C67.9 MALIGNANT NEOPLASM OF BLADDER, UNSPECIFIED: Chronic | ICD-10-CM

## 2025-07-17 DIAGNOSIS — Z98.890 OTHER SPECIFIED POSTPROCEDURAL STATES: Chronic | ICD-10-CM

## 2025-07-17 DIAGNOSIS — F41.9 ANXIETY DISORDER, UNSPECIFIED: ICD-10-CM

## 2025-07-17 DIAGNOSIS — K21.9 GASTRO-ESOPHAGEAL REFLUX DISEASE WITHOUT ESOPHAGITIS: ICD-10-CM

## 2025-07-17 DIAGNOSIS — Z90.79 ACQUIRED ABSENCE OF OTHER GENITAL ORGAN(S): Chronic | ICD-10-CM

## 2025-07-17 LAB
A1C WITH ESTIMATED AVERAGE GLUCOSE RESULT: 5.6 % — SIGNIFICANT CHANGE UP (ref 4–5.6)
ANION GAP SERPL CALC-SCNC: 15 MMOL/L — SIGNIFICANT CHANGE UP (ref 5–17)
BUN SERPL-MCNC: 19 MG/DL — SIGNIFICANT CHANGE UP (ref 7–23)
CALCIUM SERPL-MCNC: 9.2 MG/DL — SIGNIFICANT CHANGE UP (ref 8.4–10.5)
CHLORIDE SERPL-SCNC: 102 MMOL/L — SIGNIFICANT CHANGE UP (ref 96–108)
CO2 SERPL-SCNC: 22 MMOL/L — SIGNIFICANT CHANGE UP (ref 22–31)
CREAT SERPL-MCNC: 0.88 MG/DL — SIGNIFICANT CHANGE UP (ref 0.5–1.3)
EGFR: 86 ML/MIN/1.73M2 — SIGNIFICANT CHANGE UP
EGFR: 86 ML/MIN/1.73M2 — SIGNIFICANT CHANGE UP
ESTIMATED AVERAGE GLUCOSE: 114 MG/DL — SIGNIFICANT CHANGE UP (ref 68–114)
GLUCOSE SERPL-MCNC: 96 MG/DL — SIGNIFICANT CHANGE UP (ref 70–99)
HCT VFR BLD CALC: 35.8 % — LOW (ref 39–50)
HGB BLD-MCNC: 11.2 G/DL — LOW (ref 13–17)
MCHC RBC-ENTMCNC: 19.4 PG — LOW (ref 27–34)
MCHC RBC-ENTMCNC: 31.3 G/DL — LOW (ref 32–36)
MCV RBC AUTO: 62 FL — LOW (ref 80–100)
MRSA PCR RESULT.: SIGNIFICANT CHANGE UP
NRBC # BLD AUTO: 0 K/UL — SIGNIFICANT CHANGE UP (ref 0–0)
NRBC # FLD: 0 K/UL — SIGNIFICANT CHANGE UP (ref 0–0)
PLATELET # BLD AUTO: 199 K/UL — SIGNIFICANT CHANGE UP (ref 150–400)
PMV BLD: SIGNIFICANT CHANGE UP FL (ref 7–13)
POTASSIUM SERPL-MCNC: 4.5 MMOL/L — SIGNIFICANT CHANGE UP (ref 3.5–5.3)
POTASSIUM SERPL-SCNC: 4.5 MMOL/L — SIGNIFICANT CHANGE UP (ref 3.5–5.3)
RBC # BLD: 5.77 M/UL — SIGNIFICANT CHANGE UP (ref 4.2–5.8)
RBC # FLD: 16.2 % — HIGH (ref 10.3–14.5)
S AUREUS DNA NOSE QL NAA+PROBE: SIGNIFICANT CHANGE UP
SODIUM SERPL-SCNC: 139 MMOL/L — SIGNIFICANT CHANGE UP (ref 135–145)
WBC # BLD: 8.55 K/UL — SIGNIFICANT CHANGE UP (ref 3.8–10.5)
WBC # FLD AUTO: 8.55 K/UL — SIGNIFICANT CHANGE UP (ref 3.8–10.5)

## 2025-07-17 PROCEDURE — 80048 BASIC METABOLIC PNL TOTAL CA: CPT

## 2025-07-17 PROCEDURE — 87641 MR-STAPH DNA AMP PROBE: CPT

## 2025-07-17 PROCEDURE — 83036 HEMOGLOBIN GLYCOSYLATED A1C: CPT

## 2025-07-17 PROCEDURE — G0463: CPT

## 2025-07-17 PROCEDURE — 85027 COMPLETE CBC AUTOMATED: CPT

## 2025-07-17 PROCEDURE — 87640 STAPH A DNA AMP PROBE: CPT

## 2025-07-17 RX ORDER — CEFAZOLIN SODIUM IN 0.9 % NACL 3 G/100 ML
2000 INTRAVENOUS SOLUTION, PIGGYBACK (ML) INTRAVENOUS ONCE
Refills: 0 | Status: COMPLETED | OUTPATIENT
Start: 2025-08-06 | End: 2025-08-06

## 2025-07-17 RX ORDER — LIDOCAINE HCL/PF 10 MG/ML
0.2 VIAL (ML) INJECTION ONCE
Refills: 0 | Status: DISCONTINUED | OUTPATIENT
Start: 2025-08-06 | End: 2025-08-06

## 2025-07-17 RX ORDER — OMEPRAZOLE 20 MG/1
1 CAPSULE, DELAYED RELEASE ORAL
Refills: 0 | DISCHARGE

## 2025-07-17 RX ORDER — TRAMADOL HYDROCHLORIDE 50 MG/1
50 TABLET, FILM COATED ORAL ONCE
Refills: 0 | Status: DISCONTINUED | OUTPATIENT
Start: 2025-08-06 | End: 2025-08-06

## 2025-07-17 NOTE — H&P PST ADULT - PROBLEM SELECTOR PLAN 1
Right Total Hip Arthroplasty on 8/6/2025 with Dr Jayden Fajardo  Pre op instructions given, questions answered  Labs : CBC  BMP A1C MRSA   ERP instructions given  to start asa next week

## 2025-07-17 NOTE — H&P PST ADULT - ASSESSMENT
DASI score:  6.21  DASI activity:  limited activity due to right hip pain, able to drive, walks short distances to stores  Loose teeth or denture: denies    CAPRINI SCORE    AGE RELATED RISK FACTORS                                                             [ ] Age 41-60 years                                            (1 Point)  [ ] Age: 61-74 years                                           (2 Points)                 [ x ] Age= 75 years                                                (3 Points)             DISEASE RELATED RISK FACTORS                                                       [ ] Edema in the lower extremities                 (1 Point)                     [ ] Varicose veins                                               (1 Point)                                 [ ] BMI > 25 Kg/m2                                            (1 Point)                                  [ ] Serious infection (ie PNA)                            (1 Point)                     [ ] Lung disease ( COPD, Emphysema)            (1 Point)                                                                          [ ] Acute myocardial infarction                         (1 Point)                  [ ] Congestive heart failure (in the previous month)  (1 Point)         [ ] Inflammatory bowel disease                            (1 Point)                  [ ] Central venous access, PICC or Port               (2 points)       (within the last month)                                                                [ ] Stroke (in the previous month)                        (5 Points)    [ ] Previous or present malignancy                       (2 points)                                                                                                                                                         HEMATOLOGY RELATED FACTORS                                                         [ ] Prior episodes of VTE                                     (3 Points)                     [ ] Positive family history for VTE                      (3 Points)                  [ ] Prothrombin 27051 A                                     (3 Points)                     [ ] Factor V Leiden                                                (3 Points)                        [ ] Lupus anticoagulants                                      (3 Points)                                                           [ ] Anticardiolipin antibodies                              (3 Points)                                                       [ ] High homocysteine in the blood                   (3 Points)                                             [ ] Other congenital or acquired thrombophilia      (3 Points)                                                [ ] Heparin induced thrombocytopenia                  (3 Points)                                        MOBILITY RELATED FACTORS  [ ] Bed rest                                                         (1 Point)  [ ] Plaster cast                                                    (2 points)  [ ] Bed bound for more than 72 hours           (2 Points)    GENDER SPECIFIC FACTORS  [ ] Pregnancy or had a baby within the last month   (1 Point)  [ ] Post-partum < 6 weeks                                   (1 Point)  [ ] Hormonal therapy  or oral contraception   (1 Point)  [ ] History of pregnancy complications              (1 point)  [ ] Unexplained or recurrent              (1 Point)    OTHER RISK FACTORS                                           (1 Point)  [ ] BMI >40, smoking, diabetes requiring insulin, chemotherapy  blood transfusions and length of surgery over 2 hours    SURGERY RELATED RISK FACTORS  [ ]  Section within the last month     (1 Point)  [ ] Minor surgery                                                  (1 Point)  [ ] Arthroscopic surgery                                       (2 Points)  [ x ] Planned major surgery lasting more            (2 Points)      than 45 minutes     [ ] Elective hip or knee joint replacement       (5 points)       surgery                                                TRAUMA RELATED RISK FACTORS  [ ] Fracture of the hip, pelvis, or leg                       (5 Points)  [ ] Spinal cord injury resulting in paralysis             (5 points)       (in the previous month)    [ ] Paralysis  (less than 1 month)                             (5 Points)  [ ] Multiple Trauma within 1 month                        (5 Points)    Total Score [   5     ]    Caprini Score 0-2: Low Risk, NO VTE prophylaxis required for most patients, encourage ambulation  Caprini Score 3-6: Moderate Risk , pharmacologic VTE prophylaxis is indicated for most patients (in the absence of contraindications)  Caprini Score Greater than or =7: High risk, pharmocologic VTE prophylaxis indicated for most patients (in the absence of contraindications)                               DASI score:  6.21  DASI activity:  limited activity due to right hip pain, able to drive, walks short distances to stores  Loose teeth or denture: denies    CAPRINI SCORE    AGE RELATED RISK FACTORS                                                             [ ] Age 41-60 years                                            (1 Point)  [ ] Age: 61-74 years                                           (2 Points)                 [ x ] Age= 75 years                                                (3 Points)             DISEASE RELATED RISK FACTORS                                                       [ ] Edema in the lower extremities                 (1 Point)                     [ ] Varicose veins                                               (1 Point)                                 [ ] BMI > 25 Kg/m2                                            (1 Point)                                  [ ] Serious infection (ie PNA)                            (1 Point)                     [ ] Lung disease ( COPD, Emphysema)            (1 Point)                                                                          [ ] Acute myocardial infarction                         (1 Point)                  [ ] Congestive heart failure (in the previous month)  (1 Point)         [ ] Inflammatory bowel disease                            (1 Point)                  [ ] Central venous access, PICC or Port               (2 points)       (within the last month)                                                                [ ] Stroke (in the previous month)                        (5 Points)    [ ] Previous or present malignancy                       (2 points)                                                                                                                                                         HEMATOLOGY RELATED FACTORS                                                         [ ] Prior episodes of VTE                                     (3 Points)                     [ ] Positive family history for VTE                      (3 Points)                  [ ] Prothrombin 66021 A                                     (3 Points)                     [ ] Factor V Leiden                                                (3 Points)                        [ ] Lupus anticoagulants                                      (3 Points)                                                           [ ] Anticardiolipin antibodies                              (3 Points)                                                       [ ] High homocysteine in the blood                   (3 Points)                                             [ ] Other congenital or acquired thrombophilia      (3 Points)                                                [ ] Heparin induced thrombocytopenia                  (3 Points)                                        MOBILITY RELATED FACTORS  [ ] Bed rest                                                         (1 Point)  [ ] Plaster cast                                                    (2 points)  [ ] Bed bound for more than 72 hours           (2 Points)    GENDER SPECIFIC FACTORS  [ ] Pregnancy or had a baby within the last month   (1 Point)  [ ] Post-partum < 6 weeks                                   (1 Point)  [ ] Hormonal therapy  or oral contraception   (1 Point)  [ ] History of pregnancy complications              (1 point)  [ ] Unexplained or recurrent              (1 Point)    OTHER RISK FACTORS                                           (1 Point)  [ ] BMI >40, smoking, diabetes requiring insulin, chemotherapy  blood transfusions and length of surgery over 2 hours    SURGERY RELATED RISK FACTORS  [ ]  Section within the last month     (1 Point)  [ ] Minor surgery                                                  (1 Point)  [ ] Arthroscopic surgery                                       (2 Points)  [  ] Planned major surgery lasting more            (2 Points)      than 45 minutes     [ x ] Elective hip or knee joint replacement       (5 points)       surgery                                                TRAUMA RELATED RISK FACTORS  [ ] Fracture of the hip, pelvis, or leg                       (5 Points)  [ ] Spinal cord injury resulting in paralysis             (5 points)       (in the previous month)    [ ] Paralysis  (less than 1 month)                             (5 Points)  [ ] Multiple Trauma within 1 month                        (5 Points)    Total Score [   8     ]    Caprini Score 0-2: Low Risk, NO VTE prophylaxis required for most patients, encourage ambulation  Caprini Score 3-6: Moderate Risk , pharmacologic VTE prophylaxis is indicated for most patients (in the absence of contraindications)  Caprini Score Greater than or =7: High risk, pharmocologic VTE prophylaxis indicated for most patients (in the absence of contraindications)

## 2025-07-17 NOTE — H&P PST ADULT - REASON FOR ADMISSION
Right hip pain    Goals of care :  to be able to go back to performing regular daily activities and to be pain free  0 out of 10 pain

## 2025-07-17 NOTE — H&P PST ADULT - NSICDXPASTMEDICALHX_GEN_ALL_CORE_FT
PAST MEDICAL HISTORY:  Bladder cancer     GERD (gastroesophageal reflux disease)     GERD (gastroesophageal reflux disease)     HLD (hyperlipidemia)     HLD (hyperlipidemia)     HTN (hypertension)     HTN (hypertension)     Radiculopathy, lumbar region      PAST MEDICAL HISTORY:  Anxiety     Bladder cancer     GERD (gastroesophageal reflux disease)     HLD (hyperlipidemia)     HTN (hypertension)     Radiculopathy, lumbar region

## 2025-07-17 NOTE — H&P PST ADULT - NSICDXPASTSURGICALHX_GEN_ALL_CORE_FT
PAST SURGICAL HISTORY:  H/O lumbar discectomy     History of bilateral knee arthroplasty     History of total cystectomy     Malignant tumor of urinary bladder      PAST SURGICAL HISTORY:  H/O lumbar discectomy     H/O prostatectomy     History of bilateral knee arthroplasty     History of total cystectomy     Malignant tumor of urinary bladder
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
## 2025-07-23 ENCOUNTER — NON-APPOINTMENT (OUTPATIENT)
Age: 80
End: 2025-07-23

## 2025-07-29 PROBLEM — F41.9 ANXIETY DISORDER, UNSPECIFIED: Chronic | Status: ACTIVE | Noted: 2025-07-17

## 2025-08-06 ENCOUNTER — APPOINTMENT (OUTPATIENT)
Dept: ORTHOPEDIC SURGERY | Facility: HOSPITAL | Age: 80
End: 2025-08-06

## 2025-08-06 ENCOUNTER — OUTPATIENT (OUTPATIENT)
Dept: INPATIENT UNIT | Facility: HOSPITAL | Age: 80
LOS: 1 days | End: 2025-08-06
Payer: MEDICARE

## 2025-08-06 ENCOUNTER — TRANSCRIPTION ENCOUNTER (OUTPATIENT)
Age: 80
End: 2025-08-06

## 2025-08-06 VITALS
OXYGEN SATURATION: 98 % | RESPIRATION RATE: 18 BRPM | TEMPERATURE: 98 F | DIASTOLIC BLOOD PRESSURE: 87 MMHG | WEIGHT: 182.98 LBS | HEIGHT: 65.75 IN | SYSTOLIC BLOOD PRESSURE: 148 MMHG | HEART RATE: 84 BPM

## 2025-08-06 DIAGNOSIS — M16.11 UNILATERAL PRIMARY OSTEOARTHRITIS, RIGHT HIP: ICD-10-CM

## 2025-08-06 DIAGNOSIS — Z90.6 ACQUIRED ABSENCE OF OTHER PARTS OF URINARY TRACT: Chronic | ICD-10-CM

## 2025-08-06 DIAGNOSIS — Z98.890 OTHER SPECIFIED POSTPROCEDURAL STATES: Chronic | ICD-10-CM

## 2025-08-06 DIAGNOSIS — Z90.79 ACQUIRED ABSENCE OF OTHER GENITAL ORGAN(S): Chronic | ICD-10-CM

## 2025-08-06 DIAGNOSIS — C67.9 MALIGNANT NEOPLASM OF BLADDER, UNSPECIFIED: Chronic | ICD-10-CM

## 2025-08-06 DIAGNOSIS — Z96.653 PRESENCE OF ARTIFICIAL KNEE JOINT, BILATERAL: Chronic | ICD-10-CM

## 2025-08-06 PROCEDURE — 72170 X-RAY EXAM OF PELVIS: CPT | Mod: 26

## 2025-08-06 DEVICE — HEAD FEM CERAMIC V40 36MM OD -2.5MM: Type: IMPLANTABLE DEVICE | Site: RIGHT | Status: FUNCTIONAL

## 2025-08-06 DEVICE — STEM HIP NECK ANG V40 SZ 7 37X114MM 127 DEG: Type: IMPLANTABLE DEVICE | Site: RIGHT | Status: FUNCTIONAL

## 2025-08-06 DEVICE — SHELL ACET TRIDENT II E 54MM: Type: IMPLANTABLE DEVICE | Site: RIGHT | Status: FUNCTIONAL

## 2025-08-06 DEVICE — LINER ACET TRIDENT X3 0 DEG 36MM E: Type: IMPLANTABLE DEVICE | Site: RIGHT | Status: FUNCTIONAL

## 2025-08-06 RX ORDER — ASPIRIN 325 MG
81 TABLET ORAL EVERY 12 HOURS
Refills: 0 | Status: DISCONTINUED | OUTPATIENT
Start: 2025-08-06 | End: 2025-08-07

## 2025-08-06 RX ORDER — CELECOXIB 50 MG/1
200 CAPSULE ORAL EVERY 12 HOURS
Refills: 0 | Status: DISCONTINUED | OUTPATIENT
Start: 2025-08-07 | End: 2025-08-07

## 2025-08-06 RX ORDER — OMEPRAZOLE 20 MG/1
1 CAPSULE, DELAYED RELEASE ORAL
Refills: 0 | DISCHARGE

## 2025-08-06 RX ORDER — KETOROLAC TROMETHAMINE 30 MG/ML
15 INJECTION, SOLUTION INTRAMUSCULAR; INTRAVENOUS EVERY 6 HOURS
Refills: 0 | Status: DISCONTINUED | OUTPATIENT
Start: 2025-08-06 | End: 2025-08-07

## 2025-08-06 RX ORDER — SENNA 187 MG
2 TABLET ORAL AT BEDTIME
Refills: 0 | Status: DISCONTINUED | OUTPATIENT
Start: 2025-08-06 | End: 2025-08-07

## 2025-08-06 RX ORDER — TRAMADOL HYDROCHLORIDE 50 MG/1
25 TABLET, FILM COATED ORAL EVERY 6 HOURS
Refills: 0 | Status: DISCONTINUED | OUTPATIENT
Start: 2025-08-06 | End: 2025-08-07

## 2025-08-06 RX ORDER — MAGNESIUM HYDROXIDE 400 MG/5ML
30 SUSPENSION ORAL DAILY
Refills: 0 | Status: DISCONTINUED | OUTPATIENT
Start: 2025-08-06 | End: 2025-08-07

## 2025-08-06 RX ORDER — OXYCODONE HYDROCHLORIDE 30 MG/1
5 TABLET ORAL EVERY 4 HOURS
Refills: 0 | Status: DISCONTINUED | OUTPATIENT
Start: 2025-08-06 | End: 2025-08-07

## 2025-08-06 RX ORDER — ONDANSETRON HCL/PF 4 MG/2 ML
4 VIAL (ML) INJECTION EVERY 6 HOURS
Refills: 0 | Status: DISCONTINUED | OUTPATIENT
Start: 2025-08-06 | End: 2025-08-07

## 2025-08-06 RX ORDER — DEXAMETHASONE 0.5 MG/1
8 TABLET ORAL ONCE
Refills: 0 | Status: COMPLETED | OUTPATIENT
Start: 2025-08-07 | End: 2025-08-07

## 2025-08-06 RX ORDER — SERTRALINE 100 MG/1
1 TABLET, FILM COATED ORAL
Refills: 0 | DISCHARGE

## 2025-08-06 RX ORDER — SERTRALINE 100 MG/1
25 TABLET, FILM COATED ORAL DAILY
Refills: 0 | Status: DISCONTINUED | OUTPATIENT
Start: 2025-08-06 | End: 2025-08-07

## 2025-08-06 RX ORDER — OXYCODONE HYDROCHLORIDE 30 MG/1
2.5 TABLET ORAL EVERY 4 HOURS
Refills: 0 | Status: DISCONTINUED | OUTPATIENT
Start: 2025-08-06 | End: 2025-08-07

## 2025-08-06 RX ORDER — ACETAMINOPHEN 500 MG/5ML
1000 LIQUID (ML) ORAL ONCE
Refills: 0 | Status: COMPLETED | OUTPATIENT
Start: 2025-08-07 | End: 2025-08-07

## 2025-08-06 RX ORDER — CEFAZOLIN SODIUM IN 0.9 % NACL 3 G/100 ML
2000 INTRAVENOUS SOLUTION, PIGGYBACK (ML) INTRAVENOUS EVERY 8 HOURS
Refills: 0 | Status: COMPLETED | OUTPATIENT
Start: 2025-08-06 | End: 2025-08-06

## 2025-08-06 RX ORDER — ACETAMINOPHEN 500 MG/5ML
1000 LIQUID (ML) ORAL ONCE
Refills: 0 | Status: COMPLETED | OUTPATIENT
Start: 2025-08-06 | End: 2025-08-06

## 2025-08-06 RX ORDER — ACETAMINOPHEN 500 MG/5ML
975 LIQUID (ML) ORAL EVERY 8 HOURS
Refills: 0 | Status: DISCONTINUED | OUTPATIENT
Start: 2025-08-07 | End: 2025-08-07

## 2025-08-06 RX ORDER — ATORVASTATIN CALCIUM 80 MG/1
10 TABLET, FILM COATED ORAL AT BEDTIME
Refills: 0 | Status: DISCONTINUED | OUTPATIENT
Start: 2025-08-06 | End: 2025-08-07

## 2025-08-06 RX ORDER — POLYETHYLENE GLYCOL 3350 17 G/17G
17 POWDER, FOR SOLUTION ORAL AT BEDTIME
Refills: 0 | Status: DISCONTINUED | OUTPATIENT
Start: 2025-08-06 | End: 2025-08-07

## 2025-08-06 RX ADMIN — ATORVASTATIN CALCIUM 10 MILLIGRAM(S): 80 TABLET, FILM COATED ORAL at 21:30

## 2025-08-06 RX ADMIN — KETOROLAC TROMETHAMINE 15 MILLIGRAM(S): 30 INJECTION, SOLUTION INTRAMUSCULAR; INTRAVENOUS at 17:11

## 2025-08-06 RX ADMIN — Medication 500 MILLILITER(S): at 08:45

## 2025-08-06 RX ADMIN — Medication 100 MILLIGRAM(S): at 21:30

## 2025-08-06 RX ADMIN — KETOROLAC TROMETHAMINE 15 MILLIGRAM(S): 30 INJECTION, SOLUTION INTRAMUSCULAR; INTRAVENOUS at 11:34

## 2025-08-06 RX ADMIN — Medication 400 MILLIGRAM(S): at 16:44

## 2025-08-06 RX ADMIN — SERTRALINE 25 MILLIGRAM(S): 100 TABLET, FILM COATED ORAL at 11:34

## 2025-08-06 RX ADMIN — TRAMADOL HYDROCHLORIDE 50 MILLIGRAM(S): 50 TABLET, FILM COATED ORAL at 11:25

## 2025-08-06 RX ADMIN — OXYCODONE HYDROCHLORIDE 5 MILLIGRAM(S): 30 TABLET ORAL at 18:50

## 2025-08-06 RX ADMIN — OXYCODONE HYDROCHLORIDE 5 MILLIGRAM(S): 30 TABLET ORAL at 17:50

## 2025-08-06 RX ADMIN — Medication 100 MILLIGRAM(S): at 14:23

## 2025-08-06 RX ADMIN — Medication 75 MILLILITER(S): at 21:40

## 2025-08-06 RX ADMIN — OXYCODONE HYDROCHLORIDE 5 MILLIGRAM(S): 30 TABLET ORAL at 12:32

## 2025-08-06 RX ADMIN — OXYCODONE HYDROCHLORIDE 5 MILLIGRAM(S): 30 TABLET ORAL at 13:32

## 2025-08-06 RX ADMIN — Medication 500 MILLILITER(S): at 11:34

## 2025-08-06 RX ADMIN — TRAMADOL HYDROCHLORIDE 50 MILLIGRAM(S): 50 TABLET, FILM COATED ORAL at 06:19

## 2025-08-06 RX ADMIN — Medication 81 MILLIGRAM(S): at 17:11

## 2025-08-06 RX ADMIN — Medication 2 TABLET(S): at 21:30

## 2025-08-06 RX ADMIN — KETOROLAC TROMETHAMINE 15 MILLIGRAM(S): 30 INJECTION, SOLUTION INTRAMUSCULAR; INTRAVENOUS at 12:34

## 2025-08-06 RX ADMIN — POLYETHYLENE GLYCOL 3350 17 GRAM(S): 17 POWDER, FOR SOLUTION ORAL at 21:30

## 2025-08-07 ENCOUNTER — NON-APPOINTMENT (OUTPATIENT)
Age: 80
End: 2025-08-07

## 2025-08-07 ENCOUNTER — TRANSCRIPTION ENCOUNTER (OUTPATIENT)
Age: 80
End: 2025-08-07

## 2025-08-07 VITALS — OXYGEN SATURATION: 97 % | TEMPERATURE: 98 F | HEART RATE: 87 BPM | RESPIRATION RATE: 18 BRPM

## 2025-08-07 LAB
ANION GAP SERPL CALC-SCNC: 12 MMOL/L — SIGNIFICANT CHANGE UP (ref 5–17)
BUN SERPL-MCNC: 26 MG/DL — HIGH (ref 7–23)
CALCIUM SERPL-MCNC: 8.5 MG/DL — SIGNIFICANT CHANGE UP (ref 8.4–10.5)
CHLORIDE SERPL-SCNC: 104 MMOL/L — SIGNIFICANT CHANGE UP (ref 96–108)
CO2 SERPL-SCNC: 23 MMOL/L — SIGNIFICANT CHANGE UP (ref 22–31)
CREAT SERPL-MCNC: 1.05 MG/DL — SIGNIFICANT CHANGE UP (ref 0.5–1.3)
EGFR: 71 ML/MIN/1.73M2 — SIGNIFICANT CHANGE UP
EGFR: 71 ML/MIN/1.73M2 — SIGNIFICANT CHANGE UP
GLUCOSE SERPL-MCNC: 106 MG/DL — HIGH (ref 70–99)
HCT VFR BLD CALC: 31 % — LOW (ref 39–50)
HGB BLD-MCNC: 9.6 G/DL — LOW (ref 13–17)
MCHC RBC-ENTMCNC: 19.5 PG — LOW (ref 27–34)
MCHC RBC-ENTMCNC: 31 G/DL — LOW (ref 32–36)
MCV RBC AUTO: 62.9 FL — LOW (ref 80–100)
NRBC # BLD AUTO: 0 K/UL — SIGNIFICANT CHANGE UP (ref 0–0)
NRBC # FLD: 0 K/UL — SIGNIFICANT CHANGE UP (ref 0–0)
PLATELET # BLD AUTO: 195 K/UL — SIGNIFICANT CHANGE UP (ref 150–400)
PMV BLD: SIGNIFICANT CHANGE UP FL (ref 7–13)
POTASSIUM SERPL-MCNC: 4.3 MMOL/L — SIGNIFICANT CHANGE UP (ref 3.5–5.3)
POTASSIUM SERPL-SCNC: 4.3 MMOL/L — SIGNIFICANT CHANGE UP (ref 3.5–5.3)
RBC # BLD: 4.93 M/UL — SIGNIFICANT CHANGE UP (ref 4.2–5.8)
RBC # FLD: 16.3 % — HIGH (ref 10.3–14.5)
SODIUM SERPL-SCNC: 139 MMOL/L — SIGNIFICANT CHANGE UP (ref 135–145)
WBC # BLD: 10.21 K/UL — SIGNIFICANT CHANGE UP (ref 3.8–10.5)
WBC # FLD AUTO: 10.21 K/UL — SIGNIFICANT CHANGE UP (ref 3.8–10.5)

## 2025-08-07 PROCEDURE — 80048 BASIC METABOLIC PNL TOTAL CA: CPT

## 2025-08-07 PROCEDURE — 27130 TOTAL HIP ARTHROPLASTY: CPT | Mod: RT

## 2025-08-07 PROCEDURE — C1776: CPT

## 2025-08-07 PROCEDURE — 82962 GLUCOSE BLOOD TEST: CPT

## 2025-08-07 PROCEDURE — 97165 OT EVAL LOW COMPLEX 30 MIN: CPT

## 2025-08-07 PROCEDURE — 97161 PT EVAL LOW COMPLEX 20 MIN: CPT

## 2025-08-07 PROCEDURE — 85027 COMPLETE CBC AUTOMATED: CPT

## 2025-08-07 PROCEDURE — 72170 X-RAY EXAM OF PELVIS: CPT

## 2025-08-07 RX ORDER — POLYETHYLENE GLYCOL 3350 17 G/17G
17 POWDER, FOR SOLUTION ORAL
Qty: 0 | Refills: 0 | DISCHARGE
Start: 2025-08-07

## 2025-08-07 RX ORDER — SENNA 187 MG
2 TABLET ORAL
Qty: 14 | Refills: 0
Start: 2025-08-07 | End: 2025-08-13

## 2025-08-07 RX ORDER — ACETAMINOPHEN 500 MG/5ML
3 LIQUID (ML) ORAL
Qty: 63 | Refills: 0
Start: 2025-08-07 | End: 2025-08-13

## 2025-08-07 RX ORDER — TRAMADOL HYDROCHLORIDE 50 MG/1
0.5 TABLET, FILM COATED ORAL
Qty: 14 | Refills: 0
Start: 2025-08-07 | End: 2025-08-13

## 2025-08-07 RX ORDER — OXYCODONE HYDROCHLORIDE 30 MG/1
0.5 TABLET ORAL
Qty: 21 | Refills: 0
Start: 2025-08-07 | End: 2025-08-13

## 2025-08-07 RX ORDER — NALOXONE HYDROCHLORIDE 0.4 MG/ML
1 INJECTION, SOLUTION INTRAMUSCULAR; INTRAVENOUS; SUBCUTANEOUS
Qty: 1 | Refills: 0
Start: 2025-08-07

## 2025-08-07 RX ORDER — ASPIRIN 325 MG
1 TABLET ORAL
Refills: 0 | DISCHARGE

## 2025-08-07 RX ORDER — ASPIRIN 325 MG
1 TABLET ORAL
Qty: 56 | Refills: 0
Start: 2025-08-07 | End: 2025-09-03

## 2025-08-07 RX ADMIN — Medication 400 MILLIGRAM(S): at 00:07

## 2025-08-07 RX ADMIN — KETOROLAC TROMETHAMINE 15 MILLIGRAM(S): 30 INJECTION, SOLUTION INTRAMUSCULAR; INTRAVENOUS at 07:51

## 2025-08-07 RX ADMIN — Medication 500 MILLILITER(S): at 06:03

## 2025-08-07 RX ADMIN — SERTRALINE 25 MILLIGRAM(S): 100 TABLET, FILM COATED ORAL at 11:15

## 2025-08-07 RX ADMIN — Medication 1000 MILLIGRAM(S): at 01:00

## 2025-08-07 RX ADMIN — CELECOXIB 200 MILLIGRAM(S): 50 CAPSULE ORAL at 06:04

## 2025-08-07 RX ADMIN — Medication 1000 MILLIGRAM(S): at 08:31

## 2025-08-07 RX ADMIN — CELECOXIB 200 MILLIGRAM(S): 50 CAPSULE ORAL at 06:36

## 2025-08-07 RX ADMIN — Medication 81 MILLIGRAM(S): at 06:04

## 2025-08-07 RX ADMIN — Medication 40 MILLIGRAM(S): at 06:04

## 2025-08-07 RX ADMIN — Medication 400 MILLIGRAM(S): at 07:51

## 2025-08-07 RX ADMIN — KETOROLAC TROMETHAMINE 15 MILLIGRAM(S): 30 INJECTION, SOLUTION INTRAMUSCULAR; INTRAVENOUS at 08:31

## 2025-08-07 RX ADMIN — DEXAMETHASONE 101.6 MILLIGRAM(S): 0.5 TABLET ORAL at 05:30

## 2025-08-08 ENCOUNTER — TRANSCRIPTION ENCOUNTER (OUTPATIENT)
Age: 80
End: 2025-08-08

## 2025-08-09 ENCOUNTER — TRANSCRIPTION ENCOUNTER (OUTPATIENT)
Age: 80
End: 2025-08-09

## 2025-08-18 PROBLEM — Z96.641 STATUS POST TOTAL REPLACEMENT OF RIGHT HIP: Status: ACTIVE | Noted: 2025-08-18

## 2025-08-21 ENCOUNTER — APPOINTMENT (OUTPATIENT)
Dept: ORTHOPEDIC SURGERY | Facility: CLINIC | Age: 80
End: 2025-08-21
Payer: MEDICARE

## 2025-08-21 VITALS — BODY MASS INDEX: 27.47 KG/M2 | HEIGHT: 67 IN | WEIGHT: 175 LBS

## 2025-08-21 DIAGNOSIS — Z96.641 PRESENCE OF RIGHT ARTIFICIAL HIP JOINT: ICD-10-CM

## 2025-08-21 PROCEDURE — 73502 X-RAY EXAM HIP UNI 2-3 VIEWS: CPT | Mod: 26,RT

## 2025-08-21 PROCEDURE — 99024 POSTOP FOLLOW-UP VISIT: CPT

## 2025-08-22 ENCOUNTER — TRANSCRIPTION ENCOUNTER (OUTPATIENT)
Age: 80
End: 2025-08-22

## 2025-08-22 RX ORDER — OXYCODONE 5 MG/1
5 TABLET ORAL
Qty: 28 | Refills: 0 | Status: ACTIVE | COMMUNITY
Start: 2025-08-22 | End: 1900-01-01

## 2025-09-08 ENCOUNTER — TRANSCRIPTION ENCOUNTER (OUTPATIENT)
Age: 80
End: 2025-09-08

## (undated) DEVICE — BIPOLAR FORCEP STRYKER STANDARD 8" X 1MM (YELLOW)

## (undated) DEVICE — SOL IRR POUR H2O 1000ML

## (undated) DEVICE — GLV 7.5 PROTEXIS (WHITE)

## (undated) DEVICE — DRAPE BACK TABLE COVER 44X90"

## (undated) DEVICE — Device

## (undated) DEVICE — PACK DAA HIP

## (undated) DEVICE — NDL HYPO SAFE 21G X 1.5" (GREEN)

## (undated) DEVICE — MIDAS REX MR8 BALL FLUTED SM BORE 4MM X 10CM

## (undated) DEVICE — SUT QUILL MONODERM 2-0 3/8 CIRCLE 45CM

## (undated) DEVICE — FOLEY TRAY 16FR 5CC LF UMETER CLOSED

## (undated) DEVICE — DRAPE SUPINE ESYSUIT

## (undated) DEVICE — ELCTR GROUNDING PAD ADULT COVIDIEN

## (undated) DEVICE — SUT PDO 2 1/2 CIRCLE 40MM NDL 45CM

## (undated) DEVICE — MIDAS REX MR7 LUBRICANT DIFFUSER CARTRIDGE

## (undated) DEVICE — TUBING FOR SMOKE EVACUATOR (PURPLE END)

## (undated) DEVICE — SPONGE DISSECTOR PEANUT

## (undated) DEVICE — HOOD T7 NON-PEELAWAY

## (undated) DEVICE — ELCTR BOVIE PENCIL BLADE 10FT

## (undated) DEVICE — SUT VICRYL PLUS 2-0 27" FS-1 UNDYED

## (undated) DEVICE — DRSG TELFA 3 X 8

## (undated) DEVICE — POSITIONER CUSHION INSERT PRONE VIEW LG

## (undated) DEVICE — SUT MONOCRYL 2-0 27" SH UNDYED

## (undated) DEVICE — DRAPE MAYO STAND 30"

## (undated) DEVICE — GLV 8 PROTEXIS (BLUE)

## (undated) DEVICE — VENODYNE/SCD SLEEVE CALF MEDIUM

## (undated) DEVICE — DRSG DERMABOND 0.7ML

## (undated) DEVICE — DRSG BENZOIN 0.6CC

## (undated) DEVICE — SUT MONOCRYL 3-0 27" PS-2 UNDYED

## (undated) DEVICE — POSITIONER STRAP ARMBOARD VELCRO TS-30

## (undated) DEVICE — TUBING TUR 2 PRONG

## (undated) DEVICE — MIDAS REX LEGEND BALL FLUTED SM BORE 4.0MM X 10CM

## (undated) DEVICE — SAW BLADE STRYKER DUAL CUT 1.27X11 X90MM

## (undated) DEVICE — PROTECTOR HEEL / ELBOW FLUFFY

## (undated) DEVICE — SOL INJ NS 0.9% 500ML 1-PORT

## (undated) DEVICE — DRAPE LIGHT HANDLE COVER (GREEN)

## (undated) DEVICE — HOOD T7 PLUS PEELAWAY

## (undated) DEVICE — GOWN XL

## (undated) DEVICE — WARMING BLANKET FULL ADULT

## (undated) DEVICE — DRAPE SURGICAL #1010

## (undated) DEVICE — STRYKER PULSE LAVAGE WITH HIGH FLOW TIP

## (undated) DEVICE — SOL INJ NS 0.9% 1000ML

## (undated) DEVICE — POSITIONER PATIENT SAFETY STRAP 3X60"

## (undated) DEVICE — SOL IRR BAG NS 0.9% 1000ML

## (undated) DEVICE — ELCTR BOVIE PENCIL SMOKE EVACUATION

## (undated) DEVICE — DRSG CURITY GAUZE SPONGE 4 X 4" 12-PLY

## (undated) DEVICE — SOL IRR POUR NS 0.9% 500ML

## (undated) DEVICE — WARMING BLANKET UPPER ADULT

## (undated) DEVICE — DRSG TEGADERM 4X4.75"

## (undated) DEVICE — DRSG TEGADERM 1.75X1.75"

## (undated) DEVICE — DRAPE 3/4 SHEET 52X76"

## (undated) DEVICE — PREP DURAPREP 26CC

## (undated) DEVICE — SPONGE X-RAY 4X8"

## (undated) DEVICE — SUT VICRYL PLUS 0 27" OS-6 UNDYED

## (undated) DEVICE — GLV 8 PROTEXIS (CREAM) NEU-THERA

## (undated) DEVICE — DRAPE LAPAROTOMY W VELCRO CORD TABS

## (undated) DEVICE — NDL SPINAL 18G X 3.5" (PINK)

## (undated) DEVICE — DRAPE COVER SNAP 36X30"

## (undated) DEVICE — GLV 8.5 PROTEXIS (BLUE)

## (undated) DEVICE — DRAIN JACKSON PRATT 3 SPRING RESERVOIR W 10FR PVC DRAIN

## (undated) DEVICE — GOWN SURGEON VEST

## (undated) DEVICE — DRSG STERISTRIPS 0.5 X 4"

## (undated) DEVICE — PACK NEURO

## (undated) DEVICE — POSITIONER FOAM HEADREST (PINK)

## (undated) DEVICE — SUT QUILL PDO 1 45CM CTX 48MM

## (undated) DEVICE — DRAPE TOWEL BLUE 17" X 24"

## (undated) DEVICE — DRSG MEPILEX 10 X 20CM (4 X 8") POST OP AG SILVER

## (undated) DEVICE — LIJ-KMEDIC KERRISON RONGUER: Type: DURABLE MEDICAL EQUIPMENT

## (undated) DEVICE — SUT VICRYL 1 36" CTX UNDYED

## (undated) DEVICE — SOL IRR POUR H2O 500ML

## (undated) DEVICE — GLV 8 PROTEXIS (WHITE)

## (undated) DEVICE — GLV 7.5 PROTEXIS (BLUE)

## (undated) DEVICE — TAPE SILK 3"

## (undated) DEVICE — SUT QUILL MONODERM 0 1/2 CIRCLE TAPR 45CM 26MM

## (undated) DEVICE — LIGHT PIPE